# Patient Record
Sex: MALE | Race: WHITE | HISPANIC OR LATINO | Employment: UNEMPLOYED | URBAN - METROPOLITAN AREA
[De-identification: names, ages, dates, MRNs, and addresses within clinical notes are randomized per-mention and may not be internally consistent; named-entity substitution may affect disease eponyms.]

---

## 2021-10-09 ENCOUNTER — HOSPITAL ENCOUNTER (EMERGENCY)
Facility: HOSPITAL | Age: 51
Discharge: HOME | End: 2021-10-09
Attending: EMERGENCY MEDICINE | Admitting: EMERGENCY MEDICINE
Payer: MEDICAID

## 2021-10-09 ENCOUNTER — APPOINTMENT (EMERGENCY)
Dept: ULTRASOUND IMAGING | Facility: HOSPITAL | Age: 51
End: 2021-10-09
Payer: MEDICAID

## 2021-10-09 VITALS
HEART RATE: 86 BPM | DIASTOLIC BLOOD PRESSURE: 71 MMHG | SYSTOLIC BLOOD PRESSURE: 134 MMHG | OXYGEN SATURATION: 98 % | TEMPERATURE: 98.3 F | RESPIRATION RATE: 18 BRPM

## 2021-10-09 DIAGNOSIS — M54.50 CHRONIC LOW BACK PAIN: ICD-10-CM

## 2021-10-09 DIAGNOSIS — J06.9 URI (UPPER RESPIRATORY INFECTION): Primary | ICD-10-CM

## 2021-10-09 DIAGNOSIS — G89.29 CHRONIC LOW BACK PAIN: ICD-10-CM

## 2021-10-09 DIAGNOSIS — K40.90 RIGHT INGUINAL HERNIA: ICD-10-CM

## 2021-10-09 LAB
BACTERIA UR QL AUTO: ABNORMAL /HPF
BILIRUB UR QL STRIP: NEGATIVE
CLARITY UR: CLEAR
COLOR UR: YELLOW
FLUAV RNA RESP QL NAA+PROBE: NEGATIVE
FLUBV RNA RESP QL NAA+PROBE: NEGATIVE
GLUCOSE UR STRIP-MCNC: NEGATIVE MG/DL
HGB UR QL STRIP.AUTO: ABNORMAL
KETONES UR STRIP-MCNC: NEGATIVE MG/DL
LEUKOCYTE ESTERASE UR QL STRIP: NEGATIVE
NITRITE UR QL STRIP: NEGATIVE
NON-SQ EPI CELLS URNS QL MICRO: ABNORMAL /HPF
PH UR STRIP.AUTO: 6.5 [PH]
PROT UR STRIP-MCNC: NEGATIVE MG/DL
RBC #/AREA URNS AUTO: ABNORMAL /HPF
RSV RNA RESP QL NAA+PROBE: NEGATIVE
SARS-COV-2 RNA RESP QL NAA+PROBE: NEGATIVE
SP GR UR STRIP.AUTO: <=1.005 (ref 1–1.03)
UROBILINOGEN UR QL STRIP.AUTO: 0.2 E.U./DL
WBC #/AREA URNS AUTO: ABNORMAL /HPF

## 2021-10-09 PROCEDURE — 0241U HB NFCT DS VIR RESP RNA 4 TRGT: CPT | Performed by: PHYSICIAN ASSISTANT

## 2021-10-09 PROCEDURE — 99284 EMERGENCY DEPT VISIT MOD MDM: CPT | Performed by: PHYSICIAN ASSISTANT

## 2021-10-09 PROCEDURE — 81001 URINALYSIS AUTO W/SCOPE: CPT | Performed by: PHYSICIAN ASSISTANT

## 2021-10-09 PROCEDURE — 76870 US EXAM SCROTUM: CPT

## 2021-10-09 PROCEDURE — 99283 EMERGENCY DEPT VISIT LOW MDM: CPT

## 2021-10-09 RX ORDER — NAPROXEN 500 MG/1
500 TABLET ORAL 2 TIMES DAILY WITH MEALS
Qty: 30 TABLET | Refills: 0 | Status: SHIPPED | OUTPATIENT
Start: 2021-10-09 | End: 2021-12-08

## 2021-10-09 RX ORDER — LIDOCAINE 50 MG/G
1 PATCH TOPICAL DAILY
Qty: 15 PATCH | Refills: 0 | Status: SHIPPED | OUTPATIENT
Start: 2021-10-09 | End: 2021-12-08

## 2021-10-27 ENCOUNTER — TELEPHONE (OUTPATIENT)
Dept: SURGERY | Facility: CLINIC | Age: 51
End: 2021-10-27

## 2021-12-02 ENCOUNTER — OFFICE VISIT (OUTPATIENT)
Dept: SURGERY | Facility: CLINIC | Age: 51
End: 2021-12-02

## 2021-12-02 VITALS
WEIGHT: 187 LBS | RESPIRATION RATE: 18 BRPM | HEART RATE: 71 BPM | TEMPERATURE: 97.9 F | HEIGHT: 71 IN | SYSTOLIC BLOOD PRESSURE: 112 MMHG | BODY MASS INDEX: 26.18 KG/M2 | DIASTOLIC BLOOD PRESSURE: 68 MMHG

## 2021-12-02 DIAGNOSIS — G89.29 CHRONIC BACK PAIN: ICD-10-CM

## 2021-12-02 DIAGNOSIS — M54.9 CHRONIC BACK PAIN: ICD-10-CM

## 2021-12-02 DIAGNOSIS — K40.90 RIGHT INGUINAL HERNIA: Primary | ICD-10-CM

## 2021-12-02 DIAGNOSIS — K42.9 UMBILICAL HERNIA: ICD-10-CM

## 2021-12-02 PROCEDURE — 99204 OFFICE O/P NEW MOD 45 MIN: CPT | Performed by: SURGERY

## 2021-12-02 RX ORDER — CEFAZOLIN SODIUM 2 G/50ML
2000 SOLUTION INTRAVENOUS ONCE
Status: CANCELLED | OUTPATIENT
Start: 2022-02-15 | End: 2021-12-02

## 2021-12-08 NOTE — PRE-PROCEDURE INSTRUCTIONS
Pre-Surgery Instructions:   Medication Instructions    Ascorbic Acid (VITAMIN C PO) Instructed patient per Anesthesia Guidelines  Med list reviewed as above  Also instructed pt not to start any new vitamins/supplements preoperatively and to avoid NSAID's  3 days prior to surgery  Tylenol is acceptable if needed  Pt has and/or is getting CHG surgical soap and verbalizes understanding of preoperative showering protocol  Reviewed [facility] current covid policy and pt understands that it may change at any time  All information within "My Surgical Experience" pamphlet reviewed and patient verbalizes understanding and compliance  All questions answered  All information exchanged with assistance of [employer]  #[id number]

## 2022-01-03 ENCOUNTER — HOSPITAL ENCOUNTER (EMERGENCY)
Facility: HOSPITAL | Age: 52
Discharge: HOME | End: 2022-01-03
Attending: EMERGENCY MEDICINE
Payer: COMMERCIAL

## 2022-01-03 VITALS
TEMPERATURE: 98.6 F | RESPIRATION RATE: 18 BRPM | DIASTOLIC BLOOD PRESSURE: 72 MMHG | SYSTOLIC BLOOD PRESSURE: 127 MMHG | HEART RATE: 81 BPM | OXYGEN SATURATION: 99 %

## 2022-01-03 DIAGNOSIS — J06.9 URI WITH COUGH AND CONGESTION: Primary | ICD-10-CM

## 2022-01-03 PROCEDURE — U0005 INFEC AGEN DETEC AMPLI PROBE: HCPCS | Performed by: EMERGENCY MEDICINE

## 2022-01-03 PROCEDURE — 99283 EMERGENCY DEPT VISIT LOW MDM: CPT

## 2022-01-03 PROCEDURE — U0003 INFECTIOUS AGENT DETECTION BY NUCLEIC ACID (DNA OR RNA); SEVERE ACUTE RESPIRATORY SYNDROME CORONAVIRUS 2 (SARS-COV-2) (CORONAVIRUS DISEASE [COVID-19]), AMPLIFIED PROBE TECHNIQUE, MAKING USE OF HIGH THROUGHPUT TECHNOLOGIES AS DESCRIBED BY CMS-2020-01-R: HCPCS | Performed by: EMERGENCY MEDICINE

## 2022-01-03 PROCEDURE — 99284 EMERGENCY DEPT VISIT MOD MDM: CPT | Performed by: EMERGENCY MEDICINE

## 2022-01-03 NOTE — Clinical Note
Amber Navarrete was seen and treated in our emergency department on 1/3/2022  Diagnosis:     Antonietta Granda  may return to work on return date  He may return on this date: 01/07/2022         If you have any questions or concerns, please don't hesitate to call        Thanh Choudhary DO    ______________________________           _______________          _______________  Hospital Representative                              Date                                Time

## 2022-01-03 NOTE — ED PROVIDER NOTES
History  Chief Complaint   Patient presents with    Cough     Cough x 4 days  Positive covid contacts  History provided by:  Patient  URI  Presenting symptoms: congestion and rhinorrhea    Presenting symptoms: no fever    Severity:  Moderate  Onset quality:  Gradual  Timing:  Constant  Progression:  Unchanged  Chronicity:  New  Relieved by:  None tried  Worsened by:  Nothing  Ineffective treatments:  None tried  Associated symptoms: myalgias    Associated symptoms: no headaches        Prior to Admission Medications   Prescriptions Last Dose Informant Patient Reported? Taking? Ascorbic Acid (VITAMIN C PO)  Self Yes No   Sig: Take 1,000 mg by mouth daily      Facility-Administered Medications: None       Past Medical History:   Diagnosis Date    [facility]ipolar disorder ([facility])     Chronic back pain     Chronic back pain     History of herniated intervertebral disc     Inguinal hernia     Paranoia ([facility])     Schizophrenia ([facility] [address] )     Seasonal allergies     Umbilical hernia        Past Surgical History:   Procedure Laterality Date    NO PAST SURGERIES         Family History   Adopted: Yes     I have reviewed and agree with the history as documented  E-Cigarette/Vaping    E-Cigarette Use Current Every Day User      E-Cigarette/Vaping Substances    Nicotine Yes      Social History     Tobacco Use    Smoking status: Current Some Day Smoker     Types: Cigarettes    Smokeless tobacco: Never Used    Tobacco comment: 1 cigarette per day and vapes 1 x per day   Vaping Use    Vaping Use: Every day    Substances: Nicotine   Substance Use Topics    Alcohol use: Not Currently    Drug use: Yes     Frequency: 7 0 times per week     Types: Marijuana     Comment: daily       Review of Systems   Constitutional: Negative for fever  HENT: Positive for congestion and rhinorrhea  Respiratory: Negative for chest tightness and shortness of breath  Cardiovascular: Negative for chest pain     Musculoskeletal: Positive for myalgias  Skin: Negative for rash  Neurological: Negative for dizziness, light-headedness and headaches  Physical Exam  Physical Exam  Vitals reviewed  Constitutional:       Appearance: He is well-developed  HENT:      Head: Atraumatic  Eyes:      General: No scleral icterus  Right eye: No discharge  Left eye: No discharge  Conjunctiva/sclera: Conjunctivae normal    Neck:      Trachea: No tracheal deviation  Pulmonary:      Effort: Pulmonary effort is normal  No respiratory distress  Breath sounds: No stridor  Musculoskeletal:         General: No deformity  Cervical back: Neck supple  Skin:     General: Skin is warm and dry  Coloration: Skin is not pale  Findings: No erythema or rash  Neurological:      Mental Status: He is alert  Motor: No abnormal muscle tone  Coordination: Coordination normal          Vital Signs  ED Triage Vitals [01/03/22 1141]   Temperature Pulse Respirations Blood Pressure SpO2   98 6 °F (37 °C) 81 18 127/72 99 %      Temp Source Heart Rate Source Patient Position - Orthostatic VS BP Location FiO2 (%)   Oral Monitor Sitting Left arm --      Pain Score       6           Vitals:    01/03/22 1141   BP: 127/72   Pulse: 81   Patient Position - Orthostatic VS: Sitting         Visual Acuity      ED Medications  Medications - No data to display    Diagnostic Studies  Results Reviewed     Procedure Component Value Units Date/Time    COVID only - 48 hour TAT [[id number]] Collected: 01/03/22 1145    Lab Status:  In process Specimen: Nares from Nose Updated: 01/03/22 1155                 No orders to display              Procedures  Procedures         ED Course                                             MDM  Number of Diagnoses or Management Options  URI with cough and congestion: new and requires workup     Amount and/or Complexity of Data Reviewed  Clinical lab tests: ordered        Disposition  Final diagnoses:   URI with cough and congestion     Time reflects when diagnosis was documented in both MDM as applicable and the Disposition within this note     Time User Action Codes Description Comment    1/3/2022 11:44 AM Patric Doll Add [J06 9] URI with cough and congestion       ED Disposition     ED Disposition Condition Date/Time Comment    Discharge Stable Mon Juan Pablo 3, 2022 11:44 AM Zainab Pierre discharge to home/self care  Follow-up Information    None         Discharge Medication List as of 1/3/2022 11:45 AM      CONTINUE these medications which have NOT CHANGED    Details   Ascorbic Acid (VITAMIN C PO) Take 1,000 mg by mouth daily, Historical Med             No discharge procedures on file      PDMP Review     None          ED Provider  Electronically Signed by           Jeremie Bullock DO  01/03/22 2057

## 2022-01-05 LAB — SARS-COV-2 RNA RESP QL NAA+PROBE: NEGATIVE

## 2022-01-06 ENCOUNTER — TELEPHONE (OUTPATIENT)
Dept: SURGERY | Facility: CLINIC | Age: 52
End: 2022-01-06

## 2022-01-06 NOTE — TELEPHONE ENCOUNTER
I spoke with Adiel's daughter, Isma Lo  She and her father were just in the ER with an URI  They both tested negative for Covid on 1/3, but mentioned that the end of November beginning of December they were all sick with Covid, she thinks  They were never tested but both lost their taste and smell  I advised them to contact their family doctor because they are both still coughing a lot and are not well  Adiel's surgery for 1/11 is now moved to 2/15 at the [address] [place] [address],[address], pending how he feels

## 2022-01-26 ENCOUNTER — TELEPHONE (OUTPATIENT)
Dept: PAIN MEDICINE | Facility: CLINIC | Age: 52
End: 2022-01-26

## 2022-01-26 NOTE — TELEPHONE ENCOUNTER
Patients daughter is requesting records that have been faxed to our office and are scanned in to patients chart to please be faxed over to pauline Duke   [address] [place] [address]: [phone]  Fax # [phone]    Thank you

## 2022-02-07 ENCOUNTER — OFFICE VISIT (OUTPATIENT)
Dept: LAB | Facility: CLINIC | Age: 52
End: 2022-02-07
Payer: COMMERCIAL

## 2022-02-07 ENCOUNTER — APPOINTMENT (OUTPATIENT)
Dept: LAB | Facility: CLINIC | Age: 52
End: 2022-02-07
Payer: COMMERCIAL

## 2022-02-07 DIAGNOSIS — K40.90 RIGHT INGUINAL HERNIA: ICD-10-CM

## 2022-02-07 DIAGNOSIS — K42.9 UMBILICAL HERNIA: ICD-10-CM

## 2022-02-07 LAB
ALBUMIN SERPL BCP-MCNC: 3.9 G/DL (ref 3.5–5)
ALP SERPL-CCNC: 93 U/L (ref 46–116)
ALT SERPL W P-5'-P-CCNC: 22 U/L (ref 12–78)
ANION GAP SERPL CALCULATED.3IONS-SCNC: 5 MMOL/L (ref 4–13)
AST SERPL W P-5'-P-CCNC: 19 U/L (ref 5–45)
ATRIAL RATE: 63 BPM
BILIRUB SERPL-MCNC: 0.27 MG/DL (ref 0.2–1)
BUN SERPL-MCNC: 11 MG/DL (ref 5–25)
CALCIUM SERPL-MCNC: 9 MG/DL (ref 8.3–10.1)
CHLORIDE SERPL-SCNC: 104 MMOL/L (ref 100–108)
CO2 SERPL-SCNC: 28 MMOL/L (ref 21–32)
CREAT SERPL-MCNC: 0.79 MG/DL (ref 0.6–1.3)
ERYTHROCYTE [DISTWIDTH] IN BLOOD BY AUTOMATED COUNT: 15 % (ref 11.6–15.1)
GFR SERPL CREATININE-BSD FRML MDRD: 103 ML/MIN/1.73SQ M
GLUCOSE SERPL-MCNC: 99 MG/DL (ref 65–140)
HCT VFR BLD AUTO: 50.4 % (ref 36.5–49.3)
HGB BLD-MCNC: 16.1 G/DL (ref 12–17)
MCH RBC QN AUTO: 29.2 PG (ref 26.8–34.3)
MCHC RBC AUTO-ENTMCNC: 31.9 G/DL (ref 31.4–37.4)
MCV RBC AUTO: 92 FL (ref 82–98)
P AXIS: 52 DEGREES
PLATELET # BLD AUTO: 351 THOUSANDS/UL (ref 149–390)
PMV BLD AUTO: 9.4 FL (ref 8.9–12.7)
POTASSIUM SERPL-SCNC: 4.6 MMOL/L (ref 3.5–5.3)
PR INTERVAL: 116 MS
PROT SERPL-MCNC: 8.3 G/DL (ref 6.4–8.2)
QRS AXIS: -64 DEGREES
QRSD INTERVAL: 108 MS
QT INTERVAL: 400 MS
QTC INTERVAL: 409 MS
RBC # BLD AUTO: 5.51 MILLION/UL (ref 3.88–5.62)
SODIUM SERPL-SCNC: 137 MMOL/L (ref 136–145)
T WAVE AXIS: 59 DEGREES
VENTRICULAR RATE: 63 BPM
WBC # BLD AUTO: 8.78 THOUSAND/UL (ref 4.31–10.16)

## 2022-02-07 PROCEDURE — 93005 ELECTROCARDIOGRAM TRACING: CPT

## 2022-02-07 PROCEDURE — 85027 COMPLETE CBC AUTOMATED: CPT

## 2022-02-07 PROCEDURE — 36415 COLL VENOUS BLD VENIPUNCTURE: CPT

## 2022-02-07 PROCEDURE — 93010 ELECTROCARDIOGRAM REPORT: CPT | Performed by: INTERNAL MEDICINE

## 2022-02-07 PROCEDURE — 80053 COMPREHEN METABOLIC PANEL: CPT

## 2022-02-07 NOTE — PRE-PROCEDURE INSTRUCTIONS
No outpatient medications have been marked as taking for the 2/15/22 encounter [facility] Encounter)  Pt denies fever, sob, sore throat and cough  [id number]  Pt instructed to stop nsaids and supplements one week prior to surgery  Pt verbalized understanding of shower and med instructions

## 2022-02-08 ENCOUNTER — ANESTHESIA EVENT (OUTPATIENT)
Dept: PERIOP | Facility: HOSPITAL | Age: 52
End: 2022-02-08
Payer: COMMERCIAL

## 2022-02-15 ENCOUNTER — HOSPITAL ENCOUNTER (OUTPATIENT)
Facility: HOSPITAL | Age: 52
Setting detail: OUTPATIENT SURGERY
Discharge: HOME | End: 2022-02-15
Attending: SURGERY | Admitting: SURGERY
Payer: COMMERCIAL

## 2022-02-15 ENCOUNTER — ANESTHESIA (OUTPATIENT)
Dept: PERIOP | Facility: HOSPITAL | Age: 52
End: 2022-02-15
Payer: COMMERCIAL

## 2022-02-15 VITALS
HEART RATE: 77 BPM | DIASTOLIC BLOOD PRESSURE: 98 MMHG | RESPIRATION RATE: 20 BRPM | WEIGHT: 148 LBS | HEIGHT: 69 IN | OXYGEN SATURATION: 100 % | TEMPERATURE: 97 F | BODY MASS INDEX: 21.92 KG/M2 | SYSTOLIC BLOOD PRESSURE: 155 MMHG

## 2022-02-15 DIAGNOSIS — K40.90 RIGHT INGUINAL HERNIA: Primary | ICD-10-CM

## 2022-02-15 DIAGNOSIS — K42.9 UMBILICAL HERNIA: ICD-10-CM

## 2022-02-15 PROBLEM — F17.200 SMOKING: Status: ACTIVE | Noted: 2022-02-15

## 2022-02-15 PROCEDURE — 49585 PR REPAIR UMBILICAL HERN,5+Y/O,REDUC: CPT | Performed by: PHYSICIAN ASSISTANT

## 2022-02-15 PROCEDURE — C1781 MESH (IMPLANTABLE): HCPCS | Performed by: SURGERY

## 2022-02-15 PROCEDURE — 49505 PRP I/HERN INIT REDUC >5 YR: CPT | Performed by: SURGERY

## 2022-02-15 PROCEDURE — 99024 POSTOP FOLLOW-UP VISIT: CPT | Performed by: SURGERY

## 2022-02-15 PROCEDURE — 88305 TISSUE EXAM BY PATHOLOGIST: CPT | Performed by: PATHOLOGY

## 2022-02-15 PROCEDURE — 49585 PR REPAIR UMBILICAL HERN,5+Y/O,REDUC: CPT | Performed by: SURGERY

## 2022-02-15 PROCEDURE — NC001 PR NO CHARGE: Performed by: PHYSICIAN ASSISTANT

## 2022-02-15 PROCEDURE — 49505 PRP I/HERN INIT REDUC >5 YR: CPT | Performed by: PHYSICIAN ASSISTANT

## 2022-02-15 DEVICE — POLYPROPYLENE NONABSORBABLE SYNTHETIC SURGICAL MESH
Type: IMPLANTABLE DEVICE | Site: INGUINAL | Status: FUNCTIONAL
Brand: PROLENE

## 2022-02-15 RX ORDER — DEXAMETHASONE SODIUM PHOSPHATE 10 MG/ML
INJECTION, SOLUTION INTRAMUSCULAR; INTRAVENOUS AS NEEDED
Status: DISCONTINUED | OUTPATIENT
Start: 2022-02-15 | End: 2022-02-15

## 2022-02-15 RX ORDER — MIDAZOLAM HYDROCHLORIDE 2 MG/2ML
INJECTION, SOLUTION INTRAMUSCULAR; INTRAVENOUS AS NEEDED
Status: DISCONTINUED | OUTPATIENT
Start: 2022-02-15 | End: 2022-02-15

## 2022-02-15 RX ORDER — ONDANSETRON 2 MG/ML
4 INJECTION INTRAMUSCULAR; INTRAVENOUS ONCE AS NEEDED
Status: DISCONTINUED | OUTPATIENT
Start: 2022-02-15 | End: 2022-02-15 | Stop reason: HOSPADM

## 2022-02-15 RX ORDER — OXYCODONE HYDROCHLORIDE AND ACETAMINOPHEN 5; 325 MG/1; MG/1
1 TABLET ORAL EVERY 6 HOURS PRN
Qty: 10 TABLET | Refills: 0 | Status: SHIPPED | OUTPATIENT
Start: 2022-02-15 | End: 2022-02-18

## 2022-02-15 RX ORDER — BUPIVACAINE HYDROCHLORIDE AND EPINEPHRINE 2.5; 5 MG/ML; UG/ML
INJECTION, SOLUTION EPIDURAL; INFILTRATION; INTRACAUDAL; PERINEURAL AS NEEDED
Status: DISCONTINUED | OUTPATIENT
Start: 2022-02-15 | End: 2022-02-15 | Stop reason: HOSPADM

## 2022-02-15 RX ORDER — CEFAZOLIN SODIUM 2 G/50ML
2000 SOLUTION INTRAVENOUS ONCE
Status: COMPLETED | OUTPATIENT
Start: 2022-02-15 | End: 2022-02-15

## 2022-02-15 RX ORDER — FENTANYL CITRATE 50 UG/ML
INJECTION, SOLUTION INTRAMUSCULAR; INTRAVENOUS AS NEEDED
Status: DISCONTINUED | OUTPATIENT
Start: 2022-02-15 | End: 2022-02-15

## 2022-02-15 RX ORDER — SODIUM CHLORIDE, SODIUM LACTATE, POTASSIUM CHLORIDE, CALCIUM CHLORIDE 600; 310; 30; 20 MG/100ML; MG/100ML; MG/100ML; MG/100ML
125 INJECTION, SOLUTION INTRAVENOUS CONTINUOUS
Status: DISCONTINUED | OUTPATIENT
Start: 2022-02-15 | End: 2022-02-15 | Stop reason: HOSPADM

## 2022-02-15 RX ORDER — BUPIVACAINE HYDROCHLORIDE 5 MG/ML
INJECTION, SOLUTION PERINEURAL AS NEEDED
Status: DISCONTINUED | OUTPATIENT
Start: 2022-02-15 | End: 2022-02-15 | Stop reason: HOSPADM

## 2022-02-15 RX ORDER — LIDOCAINE HYDROCHLORIDE 10 MG/ML
INJECTION, SOLUTION EPIDURAL; INFILTRATION; INTRACAUDAL; PERINEURAL AS NEEDED
Status: DISCONTINUED | OUTPATIENT
Start: 2022-02-15 | End: 2022-02-15

## 2022-02-15 RX ORDER — SODIUM CHLORIDE, SODIUM LACTATE, POTASSIUM CHLORIDE, CALCIUM CHLORIDE 600; 310; 30; 20 MG/100ML; MG/100ML; MG/100ML; MG/100ML
100 INJECTION, SOLUTION INTRAVENOUS CONTINUOUS
Status: DISCONTINUED | OUTPATIENT
Start: 2022-02-15 | End: 2022-02-15 | Stop reason: HOSPADM

## 2022-02-15 RX ORDER — FENTANYL CITRATE/PF 50 MCG/ML
50 SYRINGE (ML) INJECTION
Status: DISCONTINUED | OUTPATIENT
Start: 2022-02-15 | End: 2022-02-15 | Stop reason: HOSPADM

## 2022-02-15 RX ORDER — ONDANSETRON 2 MG/ML
INJECTION INTRAMUSCULAR; INTRAVENOUS AS NEEDED
Status: DISCONTINUED | OUTPATIENT
Start: 2022-02-15 | End: 2022-02-15

## 2022-02-15 RX ORDER — PROPOFOL 10 MG/ML
INJECTION, EMULSION INTRAVENOUS AS NEEDED
Status: DISCONTINUED | OUTPATIENT
Start: 2022-02-15 | End: 2022-02-15

## 2022-02-15 RX ORDER — OXYCODONE HYDROCHLORIDE AND ACETAMINOPHEN 5; 325 MG/1; MG/1
1 TABLET ORAL ONCE AS NEEDED
Status: COMPLETED | OUTPATIENT
Start: 2022-02-15 | End: 2022-02-15

## 2022-02-15 RX ADMIN — LIDOCAINE HYDROCHLORIDE 50 MG: 10 INJECTION, SOLUTION EPIDURAL; INFILTRATION; INTRACAUDAL; PERINEURAL at 09:33

## 2022-02-15 RX ADMIN — ONDANSETRON 4 MG: 2 INJECTION INTRAMUSCULAR; INTRAVENOUS at 10:31

## 2022-02-15 RX ADMIN — FENTANYL CITRATE 25 MCG: 50 INJECTION, SOLUTION INTRAMUSCULAR; INTRAVENOUS at 09:34

## 2022-02-15 RX ADMIN — OXYCODONE HYDROCHLORIDE AND ACETAMINOPHEN 1 TABLET: 5; 325 TABLET ORAL at 11:42

## 2022-02-15 RX ADMIN — SODIUM CHLORIDE, SODIUM LACTATE, POTASSIUM CHLORIDE, AND CALCIUM CHLORIDE: .6; .31; .03; .02 INJECTION, SOLUTION INTRAVENOUS at 09:12

## 2022-02-15 RX ADMIN — CEFAZOLIN SODIUM 2000 MG: 2 SOLUTION INTRAVENOUS at 09:15

## 2022-02-15 RX ADMIN — FENTANYL CITRATE 50 MCG: 50 INJECTION INTRAMUSCULAR; INTRAVENOUS at 10:48

## 2022-02-15 RX ADMIN — FENTANYL CITRATE 25 MCG: 50 INJECTION, SOLUTION INTRAMUSCULAR; INTRAVENOUS at 09:33

## 2022-02-15 RX ADMIN — DEXAMETHASONE SODIUM PHOSPHATE 4 MG: 10 INJECTION, SOLUTION INTRAMUSCULAR; INTRAVENOUS at 09:42

## 2022-02-15 RX ADMIN — FENTANYL CITRATE 50 MCG: 50 INJECTION INTRAMUSCULAR; INTRAVENOUS at 11:01

## 2022-02-15 RX ADMIN — PROPOFOL 200 MG: 10 INJECTION, EMULSION INTRAVENOUS at 09:33

## 2022-02-15 RX ADMIN — PROPOFOL 50 MG: 10 INJECTION, EMULSION INTRAVENOUS at 09:37

## 2022-02-15 RX ADMIN — MIDAZOLAM HYDROCHLORIDE 2 MG: 1 INJECTION, SOLUTION INTRAMUSCULAR; INTRAVENOUS at 09:29

## 2022-02-15 NOTE — OP NOTE
PERATIVE REPORT  PATIENT NAME: Charles oJyce    :  1970  MRN: [id number]  Pt Location: [facility] OR ROOM 02    SURGERY DATE: 2/15/2022    Surgeon(s) and Role:     * Becky Spann MD - Primary     * Daylin Malloy PA-C - Assisting    Preop Diagnosis:  Right inguinal hernia [P29 66]  Umbilical hernia [R43 9]    Post-Op Diagnosis Codes:     * Right inguinal hernia [J09 71]     * Umbilical hernia [X00 4]    Procedure(s) (LRB):  REPAIR HERNIA INGUINAL (Right)  REPAIR HERNIA UMBILICAL (N/A)    Specimen(s):  ID Type Source Tests Collected by Time Destination   1 : UMBILICAL HERNIA SAC Tissue Soft Tissue, Other TISSUE EXAM Becky Spann MD 2/15/2022  9:50 AM        Estimated Blood Loss:   Minimal    Drains:  * No LDAs found *    Anesthesia Type:   General    Operative Indications:  Right inguinal hernia [P84 18]  Umbilical hernia [W86 0]      Operative Findings:  Same    Complications:   None    Procedure and Technique:  The patient was identified by me and placed in supine position where upon an LMA was placed and general anesthesia was started  Abdomen and groin were now prepped and draped in a normal surgical manner and a time-out was done  I decided to repair the umbilical hernia 1st   Marcaine without epinephrine was infused on 3 sides and then directly over a incision which I had drawn out with a marking pen  Skin was incised with knife and sharp dissection was done to the level of the fascia  I now bluntly dissected around the umbilical stalk off and placed a hemostat here  I sharply dissected the umbilical skin from the stalk  The hernia was less than 5 mm  This was now grasped or I should say the fascia was grasped and closed with 1 suture of interrupted Prolene  Umbilical skin was tacked down with a Vicryl and then a few other subcutaneous sutures were placed later on when both of the incisions were closed at the same time  I now paid attention to the right inguinal hernia    Skin was again marked and a ilioinguinal block was given  Transverse minimally oblique skin incision centered at the internal ring is now made about 5 cm in length  This is carried down through subcutaneous tissues with the Bovie  External oblique is cleared and the ilioinguinal nerve is again blocked at this point in time  Ileana Marixa is made in the external oblique and opened with the Metzenbaum retractors were now placed  I bluntly dissected underneath the cord and  this from the floor  Floor appeared to be intact  There was a fairly large sac which was dissected free and inverted underneath the internal ring  I now placed a finger through the internal ring and made sure that there was a big enough space to place an [employer] hernia systems mesh  The mesh was now soaked in antibiotic and placed  The underlie portion was completely opened and a nick was made in the overlie portion  The nick was on the lateral side  Fiber 6 sutures of Prolene are now used to attach the mesh to the pubic tubercle inferiorly, shelving edge medially making sure to close the defect I made in the mesh and laterally 2 sutures to the conjoined tendon  The superior most portion of the mass was simply opened up and not sutured in place secondary to closing the external oblique on top of it  Antibiotic irrigation was now done and the external oblique was closed with running Vicryl and then a couple subcutaneous sutures followed by subcuticular followed by Exofin  While that was being done the original incision for the umbilicus was closed in a totally the same way  There was no qualified resident to assist   Accordingly, Page SAVAGE was the 1st assistant    She was essential for help in the dissection and the closure   I was present for the entire procedure    Patient Disposition:  PACU       SIGNATURE: Rodger Bermudez MD  DATE: February 15, 2022  TIME: 10:35 AM

## 2022-02-15 NOTE — ANESTHESIA POSTPROCEDURE EVALUATION
Post-Op Assessment Note    CV Status:  Stable    Pain management: adequate     Mental Status:  Alert and awake   Hydration Status:  Euvolemic and stable   PONV Controlled:  Controlled   Airway Patency:  Patent      Post Op Vitals Reviewed: Yes      Staff: Anesthesiologist         No complications documented      BP      Temp     Pulse     Resp      SpO2

## 2022-02-15 NOTE — ANESTHESIA PREPROCEDURE EVALUATION
Procedure:  REPAIR HERNIA INGUINAL (Right Groin)  REPAIR HERNIA UMBILICAL (N/A Abdomen)    Relevant Problems   ANESTHESIA (within normal limits)      CARDIO (within normal limits)      MUSCULOSKELETAL   (+) Chronic back pain      NEURO/PSYCH   (+) Chronic back pain        Physical Exam    Airway    Mallampati score: I  TM Distance: >3 FB  Neck ROM: full     Dental       Cardiovascular  Rhythm: regular, Rate: normal,     Pulmonary  Breath sounds clear to auscultation,     Other Findings        Anesthesia Plan  ASA Score- 2     Anesthesia Type- general with ASA Monitors  Additional Monitors:   Airway Plan: LMA  Plan Factors-Exercise tolerance (METS): >4 METS  Chart reviewed  Existing labs reviewed  Patient summary reviewed  Patient is a current smoker  Patient smoked on day of surgery  Obstructive sleep apnea risk education given perioperatively  Induction- intravenous  Postoperative Plan- Plan for postoperative opioid use  Informed Consent- Anesthetic plan and risks discussed with patient  I personally reviewed this patient with the CRNA  Discussed and agreed on the Anesthesia Plan with the CRNA  Petey Hubbard

## 2022-02-15 NOTE — H&P
Valarie Allen MD (Physician) [facility] General Surgery  Assessment/Plan: the patient has a scrotal hernia on the right side as well as a small but painful umbilical hernia  Both will be repaired at the same time     No problem-specific Assessment & Plan notes found for this encounter          Diagnoses and all orders for this visit:     Right inguinal hernia  -     Comprehensive metabolic panel; Future  -     CBC and Platelet; Future  -     EKG 12 lead; Future  -     Case request operating room: REPAIR HERNIA INGUINAL, REPAIR HERNIA UMBILICAL; Standing  -     Case request operating room: REPAIR HERNIA INGUINAL, REPAIR HERNIA UMBILICAL     Umbilical hernia  -     Comprehensive metabolic panel; Future  -     CBC and Platelet; Future  -     EKG 12 lead; Future  -     Case request operating room: REPAIR HERNIA INGUINAL, REPAIR HERNIA UMBILICAL; Standing  -     Case request operating room: REPAIR HERNIA INGUINAL, REPAIR HERNIA UMBILICAL     Chronic back pain            Subjective:       Patient ID: Erlinda Neumann is a 46 y o  male       The patient is a 59-year-old  male with about a 4 year history of a right inguinal hernia  This has gotten larger and is to the point where it goes down into his scrotum  Can however push this back in  He also has pain for years at the umbilicus  He is Vincentian-speaking but I think he understands English fairly well  Still, we used a translation line for this        The following portions of the patient's history were reviewed and updated as appropriate: allergies, current medications, past family history, past medical history, past social history, past surgical history and problem list      Review of Systems   Constitutional:        No covid vaccines  Gained some weight   HENT: Positive for sinus pressure  Eyes:        Needs glasses   Respiratory:        13 to 33 years 1/2-1 ppd   Cardiovascular:        Negative   Gastrointestinal:        GERD   Endocrine: Negative  Genitourinary:        Recently frequency   Musculoskeletal: Positive for arthralgias and back pain  Skin: Positive for rash  ? Psoriasis, gets rashes down legs   Neurological: Positive for headaches  Hematological: Negative  Psychiatric/Behavioral:        ? Hx of depression  Sees pain management          Objective:        /68 (BP Location: Left arm, Patient Position: Sitting, Cuff Size: Adult)   Pulse 71   Temp 97 9 °F (36 6 °C)   Resp 18   Ht 5' 11" (1 803 m)   Wt 84 8 kg (187 lb)   BMI 26 08 kg/m²             Physical Exam  Vitals reviewed  Constitutional:       Appearance: Normal appearance  He is normal weight  HENT:      Head: Normocephalic and atraumatic  Eyes:      General: No scleral icterus  Conjunctiva/sclera: Conjunctivae normal    Cardiovascular:      Rate and Rhythm: Normal rate and regular rhythm  Heart sounds: Normal heart sounds  No murmur heard        Pulmonary:      Effort: Pulmonary effort is normal  No respiratory distress  Breath sounds: Normal breath sounds  No stridor  No wheezing, rhonchi or rales  Abdominal:      Palpations: Abdomen is soft  Tenderness: There is abdominal tenderness  Hernia: A hernia is present  Comments: He has a visible as well as reducible right inguinal hernia  This actually went down was testicle but I was able to push it back up  He has got a small umbilical hernia to the right of the umbilicus  This is exquisitely tender  It is also visit   Genitourinary:     Penis: Normal        Testes: Normal    Musculoskeletal:         General: Normal range of motion  Cervical back: Normal range of motion  Lymphadenopathy:      Cervical: No cervical adenopathy  Skin:     General: Skin is warm and dry  Coloration: Skin is not jaundiced  Neurological:      Mental Status: He is alert and oriented to person, place, and time     Psychiatric:         Mood and Affect: Mood normal          Behavior: Behavior normal          Thought Content: Thought content normal          Judgment: Judgment normal                 Since the above was written the patient's surgery was scheduled twice  This ended up being delayed because of possible COVID  As it turns out he had a significant URI but he was COVID negative on every test   He presents today for hernia repair  On examination he is exquisitely tender at the umbilicus as before  He has a large right inguinal hernia but this is not down in the scrotal sac today  Lungs are clear with good bilateral expansion and no rales rhonchi or wheezing    Heart sounds are normal with no heart murmur and he appears to be in regular sinus rhythm

## 2022-02-17 ENCOUNTER — OFFICE VISIT (OUTPATIENT)
Dept: SURGERY | Facility: CLINIC | Age: 52
End: 2022-02-17

## 2022-02-17 VITALS
DIASTOLIC BLOOD PRESSURE: 84 MMHG | HEIGHT: 69 IN | BODY MASS INDEX: 22.36 KG/M2 | RESPIRATION RATE: 22 BRPM | TEMPERATURE: 98.2 F | SYSTOLIC BLOOD PRESSURE: 146 MMHG | HEART RATE: 84 BPM | OXYGEN SATURATION: 98 % | WEIGHT: 151 LBS

## 2022-02-17 DIAGNOSIS — K40.90 RIGHT INGUINAL HERNIA: Primary | ICD-10-CM

## 2022-02-17 PROCEDURE — 99024 POSTOP FOLLOW-UP VISIT: CPT | Performed by: SURGERY

## 2022-02-17 RX ORDER — OXYCODONE HYDROCHLORIDE AND ACETAMINOPHEN 5; 325 MG/1; MG/1
1 TABLET ORAL EVERY 4 HOURS PRN
Qty: 12 TABLET | Refills: 0 | Status: SHIPPED | OUTPATIENT
Start: 2022-02-17 | End: 2022-04-29 | Stop reason: HOSPADM

## 2022-02-17 NOTE — PROGRESS NOTES
First postop from repair of an umbilical hernia and a right inguinal hernia  The patient complains of in nor did amounts of pain and is also concerned about swelling  On examination, he looks great  Both hernia repairs are intact  Is actually less swelling from the inguinal hernia than normal given the fact that this was a scrotal hernia     I gave him another prescription for Percocet and told his family member to make sure he only gets 3 a day along with an Aleve at the same time

## 2022-03-23 ENCOUNTER — HOSPITAL ENCOUNTER (EMERGENCY)
Facility: HOSPITAL | Age: 52
Discharge: HOME | End: 2022-03-23
Attending: EMERGENCY MEDICINE | Admitting: EMERGENCY MEDICINE
Payer: COMMERCIAL

## 2022-03-23 ENCOUNTER — APPOINTMENT (EMERGENCY)
Dept: RADIOLOGY | Facility: HOSPITAL | Age: 52
End: 2022-03-23
Payer: COMMERCIAL

## 2022-03-23 VITALS
HEART RATE: 80 BPM | OXYGEN SATURATION: 97 % | RESPIRATION RATE: 16 BRPM | SYSTOLIC BLOOD PRESSURE: 121 MMHG | DIASTOLIC BLOOD PRESSURE: 86 MMHG | TEMPERATURE: 97.8 F

## 2022-03-23 DIAGNOSIS — M54.50 LOW BACK PAIN: ICD-10-CM

## 2022-03-23 DIAGNOSIS — M25.522 LEFT ELBOW PAIN: ICD-10-CM

## 2022-03-23 DIAGNOSIS — M70.32 BURSITIS OF LEFT ELBOW: Primary | ICD-10-CM

## 2022-03-23 PROCEDURE — 72100 X-RAY EXAM L-S SPINE 2/3 VWS: CPT

## 2022-03-23 PROCEDURE — 99283 EMERGENCY DEPT VISIT LOW MDM: CPT

## 2022-03-23 PROCEDURE — 96372 THER/PROPH/DIAG INJ SC/IM: CPT

## 2022-03-23 PROCEDURE — 99284 EMERGENCY DEPT VISIT MOD MDM: CPT | Performed by: EMERGENCY MEDICINE

## 2022-03-23 PROCEDURE — 73070 X-RAY EXAM OF ELBOW: CPT

## 2022-03-23 RX ORDER — KETOROLAC TROMETHAMINE 30 MG/ML
30 INJECTION, SOLUTION INTRAMUSCULAR; INTRAVENOUS ONCE
Status: COMPLETED | OUTPATIENT
Start: 2022-03-23 | End: 2022-03-23

## 2022-03-23 RX ORDER — NAPROXEN 500 MG/1
500 TABLET ORAL 2 TIMES DAILY WITH MEALS
Qty: 20 TABLET | Refills: 0 | Status: SHIPPED | OUTPATIENT
Start: 2022-03-23 | End: 2022-04-25

## 2022-03-23 RX ORDER — OXYCODONE HYDROCHLORIDE 5 MG/1
5 TABLET ORAL EVERY 4 HOURS PRN
Qty: 15 TABLET | Refills: 0 | Status: SHIPPED | OUTPATIENT
Start: 2022-03-23 | End: 2022-03-26

## 2022-03-23 RX ADMIN — KETOROLAC TROMETHAMINE 30 MG: 30 INJECTION, SOLUTION INTRAMUSCULAR at 06:46

## 2022-03-23 NOTE — ED PROVIDER NOTES
History  Chief Complaint   Patient presents with    Elbow Swelling     C/o left elbow swelling starting three days ago  Patient is a 46year old male with increased pain and swelling of left elbow for past few days  Marvene Amara yesterday and injured his lower back on right side  Denies elbow trauma  (+) uses elbow often  No fever  No numbness  No head injury or LOC  (+) knee pain  Was last seen in this ED on 1/3/22 for URI with cough  [facility] [place] [place] [facility] website checked on this patient and last Rx filled was on 2/17/22 for percocet for 10 day supply  History provided by:  Patient and relative (daughter)  Elbow Swelling  Associated symptoms: back pain    Associated symptoms: no fever        Prior to Admission Medications   Prescriptions Last Dose Informant Patient Reported? Taking?   oxyCODONE-acetaminophen (Percocet) 5-325 mg per tablet   No No   Sig: Take 1 tablet by mouth every 4 (four) hours as needed for moderate pain for up to 12 doses Max Daily Amount: 6 tablets      Facility-Administered Medications: None       Past Medical History:   Diagnosis Date    [facility] )     Chronic back pain     Chronic back pain     History of herniated intervertebral disc     Inguinal hernia     [facility]anoia ([facility] )     Schizophrenia ([facility] )     Seasonal allergies     Umbilical hernia        Past Surgical History:   Procedure Laterality Date    NO PAST SURGERIES      ND REPAIR ING HERNIA,5+Y/O,REDUCIBL Right 2/15/2022    Procedure: REPAIR HERNIA INGUINAL;  Surgeon: Manuel Bernal MD;  Location:  MAIN OR;  Service: General    ND REPAIR UMBILICAL PLXN,7+Q/Z,RZDJP N/A 2/15/2022    Procedure: REPAIR HERNIA UMBILICAL;  Surgeon: Manuel Bernal MD;  Location:  MAIN OR;  Service: General       Family History   Adopted: Yes     I have reviewed and agree with the history as documented      E-Cigarette/Vaping    E-Cigarette Use Former User      E-Cigarette/Vaping Substances    Nicotine No     THC No     CBD No     Flavoring No     Other No     Unknown No      Social History     Tobacco Use    Smoking status: Current Some Day Smoker     Types: Cigarettes    Smokeless tobacco: Never Used    Tobacco comment: 1 cigarette per day and vapes 1 x per day   Vaping Use    Vaping Use: Former   Substance Use Topics    Alcohol use: Not Currently    Drug use: Yes     Frequency: 7 0 times per week     Types: Marijuana     Comment: daily       Review of Systems   Constitutional: Negative for fever  Musculoskeletal: Positive for arthralgias, back pain and joint swelling  Neurological: Negative for numbness  All other systems reviewed and are negative  Physical Exam  Physical Exam  Vitals and nursing note reviewed  Constitutional:       General: He is in acute distress (moderate)  HENT:      Head: Normocephalic and atraumatic  Mouth/Throat:      Mouth: Mucous membranes are moist    Eyes:      General: No scleral icterus  Cardiovascular:      Rate and Rhythm: Normal rate and regular rhythm  Heart sounds: Normal heart sounds  No murmur heard  Pulmonary:      Effort: Pulmonary effort is normal  No respiratory distress  Breath sounds: Normal breath sounds  Abdominal:      General: Bowel sounds are normal       Palpations: Abdomen is soft  Tenderness: There is no abdominal tenderness  Musculoskeletal:         General: Swelling (left elbow) and tenderness (left elbow and paravertebral right lower lumbar region) present  Normal range of motion  Cervical back: Normal range of motion and neck supple  Right lower leg: No edema  Left lower leg: No edema  Skin:     General: Skin is warm and dry  Findings: No bruising, erythema or rash  Neurological:      General: No focal deficit present  Mental Status: He is alert and oriented to person, place, and time     Psychiatric:         Mood and Affect: Mood normal          Vital Signs  ED Triage Vitals [03/23/22 0626]   Temperature Pulse Respirations Blood Pressure SpO2   97 8 °F (36 6 °C) 75 16 121/86 97 %      Temp Source Heart Rate Source Patient Position - Orthostatic VS BP Location FiO2 (%)   Oral Monitor Sitting Right arm --      Pain Score       7           Vitals:    03/23/22 0626 03/23/22 0630   BP: 121/86 121/86   Pulse: 75 80   Patient Position - Orthostatic VS: Sitting          Visual Acuity      ED Medications  Medications   ketorolac (TORADOL) injection 30 mg (30 mg Intramuscular Given 3/23/22 0646)       Diagnostic Studies  Results Reviewed     None                 XR elbow 2 vw LEFT   ED Interpretation by Sophy Altman MD (03/23 0708)   No fx or dislocation or fb read by me  XR lumbar spine 2 or 3 views   ED Interpretation by Sophy Altman MD (03/23 9446)   DJD, disc space narrowing T12-L1 and no fx read by me  Procedures  Splint application    Date/Time: 3/23/2022 7:10 AM  Performed by: Sophy Altman MD  Authorized by: Sophy Altman MD   Universal Protocol:  Procedure performed by:  Consent given by: patient  Time out: Immediately prior to procedure a "time out" was called to verify the correct patient, procedure, equipment, support staff and site/side marked as required  Timeout called at: 3/23/2022 7:10 AM   Patient identity confirmed: verbally with patient      Pre-procedure details:     Sensation:  Normal    Skin color:  Normal  Procedure details:     Laterality:  Left    Location:  Elbow    Elbow:  L elbow    Strapping: yes      Supplies:  Elastic bandage  Post-procedure details:     Pain:  Unchanged    Sensation:  Unchanged    Skin color:  Normal    Patient tolerance of procedure: Tolerated well, no immediate complications             ED Course  ED Course as of 03/23/22 0718   Wed Mar 23, 2022   4595 X-rays d/w patient                                                MDM  Number of Diagnoses or Management Options  Diagnosis management comments: DDx including but not limited to: Doubt intracranial injury, concussion, cervical injury, intrathoracic injury, intraabdominal injury; extremity injury--fracture, dislocation, strain, sprain, contusion, vertebral fx, arthritis, bursitis, tendinitis; doubt septic bursitis or septic arthritis or abscess or cellulitis  Amount and/or Complexity of Data Reviewed  Tests in the radiology section of CPT®: ordered and reviewed  Decide to obtain previous medical records or to obtain history from someone other than the patient: yes  Obtain history from someone other than the patient: yes  Review and summarize past medical records: yes  Independent visualization of images, tracings, or specimens: yes        Disposition  Final diagnoses:   Bursitis of left elbow   Left elbow pain   Low back pain     Time reflects when diagnosis was documented in both MDM as applicable and the Disposition within this note     Time User Action Codes Description Comment    3/23/2022  6:54 AM Fifimel Brian Add [M70 32] Bursitis of left elbow     3/23/2022  6:54 AM Charmel Brian Add [Z48 292] Left elbow pain     3/23/2022  6:54 AM Charmel Brian Add [M54 50] Low back pain       ED Disposition     ED Disposition Condition Date/Time Comment    Discharge Stable Wed Mar 23, 2022  7:14 AM Teo Kessler discharge to home/self care  Follow-up Information     Follow up With Specialties Details Why Contact Info Additional [address] [facility] Call in 1 day Elevate, warm compresses  No driving with oxycodone  Return sooner if increased pain, worsening swelling, fever, redness, red streaks, numbness, weakness, incontinence, difficulty urinating, rash   [address] [phone] [address] [facility] [address] [place], [place], [id number] [phone]          Patient's Medications   Discharge Prescriptions    NAPROXEN (NAPROSYN) 500 MG TABLET    Take 1 tablet (500 mg total) by mouth 2 (two) times a day with meals for 10 days       Start Date: 3/23/2022 End Date: 4/2/2022       Order Dose: 500 mg       Quantity: 20 tablet    Refills: 0    OXYCODONE (ROXICODONE) 5 IMMEDIATE RELEASE TABLET    Take 1 tablet (5 mg total) by mouth every 4 (four) hours as needed for moderate pain for up to 3 days Max Daily Amount: 30 mg       Start Date: 3/23/2022 End Date: 3/26/2022       Order Dose: 5 mg       Quantity: 15 tablet    Refills: 0       No discharge procedures on file      PDMP Review       Value Time User    PDMP Reviewed  Yes 3/23/2022  6:20 AM Devaughn Angel MD          ED Provider  Electronically Signed by           Devaughn Angel MD  03/23/22 5967

## 2022-03-25 ENCOUNTER — OFFICE VISIT (OUTPATIENT)
Dept: OBGYN CLINIC | Facility: HOSPITAL | Age: 52
End: 2022-03-25
Payer: COMMERCIAL

## 2022-03-25 VITALS
HEART RATE: 86 BPM | HEIGHT: 69 IN | SYSTOLIC BLOOD PRESSURE: 152 MMHG | DIASTOLIC BLOOD PRESSURE: 76 MMHG | WEIGHT: 151 LBS | BODY MASS INDEX: 22.36 KG/M2

## 2022-03-25 DIAGNOSIS — M70.22 OLECRANON BURSITIS OF LEFT ELBOW: Primary | ICD-10-CM

## 2022-03-25 PROCEDURE — 99203 OFFICE O/P NEW LOW 30 MIN: CPT | Performed by: PHYSICIAN ASSISTANT

## 2022-03-25 NOTE — PROGRESS NOTES
Assessment:    Hemorrhagic olecranon bursitis, left, non-painful      Plan:    Elbow rest as able and ACE compression wrap to the area  Heating pad several times / day  Continue Naproxen  Follow-up 2-3 weeks for re-evaluation with primary care sports medicine          Problem List Items Addressed This Visit        Musculoskeletal and Integument    Olecranon bursitis of left elbow - Primary                   Subjective:     Patient ID:  Amber Navarrete is a 46 y o  male    HPI    59-year-old male presenting for evaluation of his left elbow  According to the patient and family member present, he bumped his posterior elbow about a week ago and following this he noticed a small lump localized to his olecranon region  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, and 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, and he thinks that the swelling increased after this  He has not noticed any open wounds or erythema/warmth to the area  He denies any fever or chills  He states that it is nonpainful or bothersome to him  He presented to an ER where x-rays were negative for acute findings and he was referred here for further evaluation  The following portions of the patient's history were reviewed and updated as appropriate: allergies, current medications, past family history, past medical history, past social history, past surgical history and problem list     Review of Systems     Objective:    Imaging:  Left elbow 3/23/2022  VIEWS:  XR ELBOW 2 VW LEFT   Images: 2     FINDINGS:  Prominent posterior soft tissue swelling consistent with olecranon bursitis  Olecranon enthesophyte      There is no acute fracture or dislocation      There is no joint effusion      No significant degenerative changes      No lytic or blastic osseous lesion      Soft tissues are unremarkable      IMPRESSION:  Olecranon bursitis  Enthesophyte  No acute osseous abnormality        Vitals:    03/25/22 1111   BP: 152/76   Pulse: 86           Physical Exam Orthopedic Examination:  Left elbow    Inspection:  There is a golf ball-sized olecranon bursa that is of normal color  There is no erythema  There is no overlying cellulitis  No open wounds  Palpation:  No warmth  Olecranon bursa is nontender to palpation, it is fluctuant  Range-of-motion:  Full, normal ROM elbow flexion extension, pronation supination without pain or restriction      Strength:  5/5 elbow flexion extension    Sensation:  Intact median radial ulnar nerve distribution    Special Tests:  Elbow joint is stable to varus and valgus stress   Palpable radial pulse

## 2022-04-13 ENCOUNTER — TELEPHONE (OUTPATIENT)
Dept: OBGYN CLINIC | Facility: HOSPITAL | Age: 52
End: 2022-04-13

## 2022-04-13 ENCOUNTER — APPOINTMENT (OUTPATIENT)
Dept: LAB | Facility: HOSPITAL | Age: 52
End: 2022-04-13
Attending: ORTHOPAEDIC SURGERY
Payer: COMMERCIAL

## 2022-04-13 ENCOUNTER — OFFICE VISIT (OUTPATIENT)
Dept: OBGYN CLINIC | Facility: CLINIC | Age: 52
End: 2022-04-13
Payer: COMMERCIAL

## 2022-04-13 ENCOUNTER — PREP FOR PROCEDURE (OUTPATIENT)
Dept: OBGYN CLINIC | Facility: CLINIC | Age: 52
End: 2022-04-13

## 2022-04-13 VITALS — HEIGHT: 69 IN | BODY MASS INDEX: 22.3 KG/M2

## 2022-04-13 DIAGNOSIS — Z01.818 PRE-OP TESTING: ICD-10-CM

## 2022-04-13 DIAGNOSIS — M70.22 OLECRANON BURSITIS OF LEFT ELBOW: ICD-10-CM

## 2022-04-13 DIAGNOSIS — M70.22 OLECRANON BURSITIS OF LEFT ELBOW: Primary | ICD-10-CM

## 2022-04-13 LAB
ANION GAP SERPL CALCULATED.3IONS-SCNC: 5 MMOL/L (ref 4–13)
BUN SERPL-MCNC: 15 MG/DL (ref 5–25)
CALCIUM SERPL-MCNC: 9.5 MG/DL (ref 8.4–10.2)
CHLORIDE SERPL-SCNC: 104 MMOL/L (ref 96–108)
CO2 SERPL-SCNC: 27 MMOL/L (ref 21–32)
CREAT SERPL-MCNC: 0.73 MG/DL (ref 0.6–1.3)
GFR SERPL CREATININE-BSD FRML MDRD: 107 ML/MIN/1.73SQ M
GLUCOSE P FAST SERPL-MCNC: 92 MG/DL (ref 65–99)
POTASSIUM SERPL-SCNC: 4.5 MMOL/L (ref 3.5–5.3)
SODIUM SERPL-SCNC: 136 MMOL/L (ref 135–147)

## 2022-04-13 PROCEDURE — 99214 OFFICE O/P EST MOD 30 MIN: CPT | Performed by: ORTHOPAEDIC SURGERY

## 2022-04-13 PROCEDURE — 80048 BASIC METABOLIC PNL TOTAL CA: CPT

## 2022-04-13 PROCEDURE — 36415 COLL VENOUS BLD VENIPUNCTURE: CPT

## 2022-04-13 RX ORDER — CHLORHEXIDINE GLUCONATE 0.12 MG/ML
15 RINSE ORAL ONCE
Status: CANCELLED | OUTPATIENT
Start: 2022-04-13 | End: 2022-04-13

## 2022-04-13 RX ORDER — CHLORHEXIDINE GLUCONATE 4 G/100ML
SOLUTION TOPICAL DAILY PRN
Status: CANCELLED | OUTPATIENT
Start: 2022-04-13

## 2022-04-13 RX ORDER — CEFAZOLIN SODIUM 1 G/50ML
1000 SOLUTION INTRAVENOUS ONCE
Status: CANCELLED | OUTPATIENT
Start: 2022-04-29 | End: 2022-04-13

## 2022-04-13 NOTE — PROGRESS NOTES
[address] [address] [place] [address]  Galo Hassan  [id number]  1970    ORTHOPAEDIC SURGERY OUTPATIENT NOTE  4/13/2022      HISTORY:  46 y o  male presents today for initial evaluation for his left olecranon bursitis  Patient was seen in the ED on 03/23/2022, due to increased pain and swelling after fall on 03/22/2022  He was then seen by TANIA Mauro on 03/25/2022 who recommended to use a compression wrap on the elbow, heating several times daily, use of naproxen as needed for pain/discomfort/swelling      Past Medical History:   Diagnosis Date    [facility] ([facility] )     Chronic back pain     Chronic back pain     History of herniated intervertebral disc     Inguinal hernia     Paranoia ([facility])     Schizophrenia ([facility] )     Seasonal allergies     Umbilical hernia        Past Surgical History:   Procedure Laterality Date    NO PAST SURGERIES      [place] REPAIR ING HERNIA,5+Y/O,REDUCIBL Right 2/15/2022    Procedure: REPAIR HERNIA INGUINAL;  Surgeon: Jd Jaramillo MD;  Location: [facility] MAIN OR;  Service: General    [place] REPAIR UMBILICAL PJAH,2+F/Q,EAVEM N/A 2/15/2022    Procedure: REPAIR HERNIA UMBILICAL;  Surgeon: Jd Jaramillo MD;  Location: [facility] MAIN OR;  Service: General       Social History     Socioeconomic History    Marital status: Single     Spouse name: Not on file    Number of children: Not on file    Years of education: Not on file    Highest education level: Not on file   Occupational History    Not on file   Tobacco Use    Smoking status: Current Some Day Smoker     Types: Cigarettes    Smokeless tobacco: Never Used    Tobacco comment: 1 cigarette per day and vapes 1 x per day   Vaping Use    Vaping Use: Former   Substance and Sexual Activity    Alcohol use: Not Currently    Drug use: Yes     Frequency: 7 0 times per week     Types: Marijuana     Comment: daily    Sexual activity: Not on file   Other Topics Concern    Not on file   Social History Narrative    Not on file     Social Determinants of Health     Financial Resource Strain: Not on file   Food Insecurity: Not on file   Transportation Needs: Not on file   Physical Activity: Not on file   Stress: Not on file   Social Connections: Not on file   Intimate Partner Violence: Not on file   Housing Stability: Not on file       Family History   Adopted: Yes        Patient's Medications   New Prescriptions    No medications on file   Previous Medications    NAPROXEN (NAPROSYN) 500 MG TABLET    Take 1 tablet (500 mg total) by mouth 2 (two) times a day with meals for 10 days    OXYCODONE-ACETAMINOPHEN (PERCOCET) 5-325 MG PER TABLET    Take 1 tablet by mouth every 4 (four) hours as needed for moderate pain for up to 12 doses Max Daily Amount: 6 tablets   Modified Medications    No medications on file   Discontinued Medications    No medications on file       No Known Allergies      5' 9" (1 753 m)   BMI 22 30 kg/m²      REVIEW OF SYSTEMS:  Constitutional: Negative  HEENT: Negative  Respiratory: Negative  Skin: Negative  Neurological: Negative  Psychiatric/Behavioral: Negative  Musculoskeletal: Negative except for that mentioned in the HPI      PHYSICAL EXAM:      R elbow:  Flexion: 140 degrees  Extension: 0 degrees  Pronation: 80 degrees  Supination: 80 degrees    TTP Lateral Epicondyle: negative  TTP Medial Epicondyle: negative  TTP Olecranon: negative  TTP Radial Head: negative  TTP Biceps Tendon: negative    Strength:  Flexion: 5/5  Extension: 5/5  Pronation: 5/5  Supination: 5/5    Pain with resisted wrist extension: negative  Pain with resisted 3rd finger extension: negative  Pain with resisted wrist flexion: negative    Varus laxity: negative  Valgus laxity: negative  Milking maneuver: negative  Moving valgus stress test: negative    Cubital tunnel Tinel's: negative    Radial/median/ulnar nerve intact    <2 sec cap refill      L elbow:  Flexion: 140 degrees  Extension: 0 degrees  Pronation: 80 degrees  Supination: 80 degrees    TTP Lateral Epicondyle: negative  TTP Medial Epicondyle: negative  TTP Olecranon: Positive at the triceps attachment  TTP Radial Head: negative  TTP Biceps Tendon: negative    Strength:  Flexion: 5/5  Extension: 5/5  Pronation: 5/5  Supination: 5/5    Pain with resisted wrist extension: negative  Pain with resisted 3rd finger extension: negative  Pain with resisted wrist flexion: negative    Varus laxity: negative  Valgus laxity: negative  Milking maneuver: negative  Moving valgus stress test: negative    Cubital tunnel Tinel's: negative    Radial/median/ulnar nerve intact    <2 sec cap refill    Neck:   Spurling's Maneuver: negative  FROM flexion, extension, rotation, sidebending    Reflexes:   Triceps: symmetric bilaterally  Biceps: symmetric bilaterally  Brachioradialis: symmetric bilaterally      Integumentary: - intact  Bruising: - No  Abrasion: - No   Rash - No   Laceration: - No       IMAGING:  Xrays of left elbow reviewed form 3/23/22:   Enthesophyte noted with olecranon bursal swelling    ASSESSMENT AND PLAN:  46 y o  male  Recurrent Olecranon bursitis of left elbow  Non operative treatments were discussed with the patient that includes an aspiration, did explain that there is a reoccurrence rate due to there being an Enthesophyte that continues to irritate the bursa sac  Surgical intervention would be an excision of olecranon bursa and osteophyte of the left elbow  The risks, benefits and recovery was discussed with the patient that includes continued soreness at the surgical site for up to 6-8 weeks post operatively  Patient shows understanding and agrees with this plan of care   The patient understands the risks and benefits of the procedure with risks including pain, stiffness, infection, neurovascular injury, recurrence of symptoms, failure of surgical procedure, inadvertent intraoperative complications, blood loss, blood clots, allergic reaction to anesthesia, stroke, heart attack, all up to and including to death  The patient understood and did consent for surgery today       Scribe Attestation    I,:  Kimber Tovar am acting as a scribe while in the presence of the attending physician :       I,:  Minerva Tam personally performed the services described in this documentation    as scribed in my presence :

## 2022-04-13 NOTE — TELEPHONE ENCOUNTER
Pt daughter calling, will be about 13 min late,  Attempted calling lisseth office with no answer,  Provided that may need to reschedule and unable to reach office      Daughter understood,

## 2022-04-21 ENCOUNTER — TELEPHONE (OUTPATIENT)
Dept: OBGYN CLINIC | Facility: CLINIC | Age: 52
End: 2022-04-21

## 2022-04-21 NOTE — TELEPHONE ENCOUNTER
Patient needs P/O appt changed to May 5th due Dr Savage hCester now being in the Operating Room all day Friday May 6th        He will be in the OR all day on Fridays beginning May 6th

## 2022-04-25 ENCOUNTER — ANESTHESIA EVENT (OUTPATIENT)
Dept: PERIOP | Facility: HOSPITAL | Age: 52
End: 2022-04-25
Payer: COMMERCIAL

## 2022-04-25 NOTE — PRE-PROCEDURE INSTRUCTIONS
No outpatient medications have been marked as taking for the 4/29/22 encounter [facility] Encounter)  Covid screening negative as per patient  Reviewed with patient via phone using [employer]  #[id number] all medication instructions  Advised not to take any NSAID's, Vitamins or Herbal products prior to the DOS  Acetaminophen products are ok to take  Reviewed showering instructions as given by surgical office  Instructed to call office with any questions or concerns  Instructed about NPO after midnight the night before DOS, except sips of water with allowed medications in AM on DOS  Smoking Cessation Education offered but Patient states he is not interested at this time  Instructed not to smoke any tobacco products or marijuana for 48 hours prior to surgery  Informed about call from José Miguel Barragan with the time to arrive for the scheduled surgery  Patient verbalized understanding

## 2022-04-28 NOTE — ANESTHESIA PREPROCEDURE EVALUATION
Procedure:  EXCISION BURSA OLECRANON with excision of osteophyte (Left Elbow)    Relevant Problems   MUSCULOSKELETAL   (+) Chronic back pain      NEURO/PSYCH   (+) Chronic back pain      PULMONARY   (+) Smoking        Physical Exam    Airway    Mallampati score: II  TM Distance: >3 FB  Neck ROM: full     Dental       Cardiovascular  Cardiovascular exam normal    Pulmonary  Pulmonary exam normal     Other Findings        Anesthesia Plan  ASA Score- 2     Anesthesia Type- general with ASA Monitors  Additional Monitors:   Airway Plan: LMA  Plan Factors-Exercise tolerance (METS): >4 METS  Chart reviewed  EKG reviewed  Imaging results reviewed  Existing labs reviewed  Patient summary reviewed  Patient is a current smoker  Induction- intravenous  Postoperative Plan- Plan for postoperative opioid use  Informed Consent- Anesthetic plan and risks discussed with patient  I personally reviewed this patient with the CRNA  Discussed and agreed on the Anesthesia Plan with the CRNA  Linda Olvera

## 2022-04-29 ENCOUNTER — HOSPITAL ENCOUNTER (OUTPATIENT)
Facility: HOSPITAL | Age: 52
Setting detail: OUTPATIENT SURGERY
Discharge: HOME | End: 2022-04-29
Attending: ORTHOPAEDIC SURGERY | Admitting: ORTHOPAEDIC SURGERY
Payer: COMMERCIAL

## 2022-04-29 ENCOUNTER — ANESTHESIA (OUTPATIENT)
Dept: PERIOP | Facility: HOSPITAL | Age: 52
End: 2022-04-29
Payer: COMMERCIAL

## 2022-04-29 VITALS
HEART RATE: 72 BPM | BODY MASS INDEX: 22.13 KG/M2 | SYSTOLIC BLOOD PRESSURE: 139 MMHG | HEIGHT: 68 IN | DIASTOLIC BLOOD PRESSURE: 59 MMHG | TEMPERATURE: 97.3 F | OXYGEN SATURATION: 100 % | RESPIRATION RATE: 16 BRPM | WEIGHT: 146 LBS

## 2022-04-29 DIAGNOSIS — Z01.818 PRE-OP TESTING: ICD-10-CM

## 2022-04-29 DIAGNOSIS — M70.22 OLECRANON BURSITIS OF LEFT ELBOW: ICD-10-CM

## 2022-04-29 LAB
INR PPP: 0.97 (ref 0.84–1.19)
PROTHROMBIN TIME: 12.7 SECONDS (ref 11.6–14.5)

## 2022-04-29 PROCEDURE — 85610 PROTHROMBIN TIME: CPT | Performed by: ORTHOPAEDIC SURGERY

## 2022-04-29 PROCEDURE — 24120 EXC/CRTG B1 CST/B9 TUM RDS: CPT | Performed by: ORTHOPAEDIC SURGERY

## 2022-04-29 RX ORDER — KETOROLAC TROMETHAMINE 30 MG/ML
INJECTION, SOLUTION INTRAMUSCULAR; INTRAVENOUS AS NEEDED
Status: DISCONTINUED | OUTPATIENT
Start: 2022-04-29 | End: 2022-04-29

## 2022-04-29 RX ORDER — ONDANSETRON 2 MG/ML
INJECTION INTRAMUSCULAR; INTRAVENOUS AS NEEDED
Status: DISCONTINUED | OUTPATIENT
Start: 2022-04-29 | End: 2022-04-29

## 2022-04-29 RX ORDER — NAPROXEN 250 MG/1
500 TABLET ORAL 2 TIMES DAILY WITH MEALS
COMMUNITY

## 2022-04-29 RX ORDER — MIDAZOLAM HYDROCHLORIDE 2 MG/2ML
INJECTION, SOLUTION INTRAMUSCULAR; INTRAVENOUS AS NEEDED
Status: DISCONTINUED | OUTPATIENT
Start: 2022-04-29 | End: 2022-04-29

## 2022-04-29 RX ORDER — CEPHALEXIN 500 MG/1
500 CAPSULE ORAL EVERY 6 HOURS SCHEDULED
Qty: 8 CAPSULE | Refills: 0 | Status: SHIPPED | OUTPATIENT
Start: 2022-04-29 | End: 2022-05-01

## 2022-04-29 RX ORDER — SODIUM CHLORIDE, SODIUM LACTATE, POTASSIUM CHLORIDE, CALCIUM CHLORIDE 600; 310; 30; 20 MG/100ML; MG/100ML; MG/100ML; MG/100ML
INJECTION, SOLUTION INTRAVENOUS CONTINUOUS PRN
Status: DISCONTINUED | OUTPATIENT
Start: 2022-04-29 | End: 2022-04-29

## 2022-04-29 RX ORDER — CEFAZOLIN SODIUM 2 G/50ML
SOLUTION INTRAVENOUS AS NEEDED
Status: DISCONTINUED | OUTPATIENT
Start: 2022-04-29 | End: 2022-04-29

## 2022-04-29 RX ORDER — GLYCOPYRROLATE 0.2 MG/ML
INJECTION INTRAMUSCULAR; INTRAVENOUS AS NEEDED
Status: DISCONTINUED | OUTPATIENT
Start: 2022-04-29 | End: 2022-04-29

## 2022-04-29 RX ORDER — PROPOFOL 10 MG/ML
INJECTION, EMULSION INTRAVENOUS AS NEEDED
Status: DISCONTINUED | OUTPATIENT
Start: 2022-04-29 | End: 2022-04-29

## 2022-04-29 RX ORDER — KETAMINE HYDROCHLORIDE 50 MG/ML
INJECTION, SOLUTION, CONCENTRATE INTRAMUSCULAR; INTRAVENOUS AS NEEDED
Status: DISCONTINUED | OUTPATIENT
Start: 2022-04-29 | End: 2022-04-29

## 2022-04-29 RX ORDER — LIDOCAINE HYDROCHLORIDE 10 MG/ML
INJECTION, SOLUTION EPIDURAL; INFILTRATION; INTRACAUDAL; PERINEURAL AS NEEDED
Status: DISCONTINUED | OUTPATIENT
Start: 2022-04-29 | End: 2022-04-29

## 2022-04-29 RX ORDER — FENTANYL CITRATE/PF 50 MCG/ML
25 SYRINGE (ML) INJECTION
Status: COMPLETED | OUTPATIENT
Start: 2022-04-29 | End: 2022-04-29

## 2022-04-29 RX ORDER — CHLORHEXIDINE GLUCONATE 0.12 MG/ML
15 RINSE ORAL ONCE
Status: COMPLETED | OUTPATIENT
Start: 2022-04-29 | End: 2022-04-29

## 2022-04-29 RX ORDER — CHLORHEXIDINE GLUCONATE 4 G/100ML
SOLUTION TOPICAL DAILY PRN
Status: DISCONTINUED | OUTPATIENT
Start: 2022-04-29 | End: 2022-04-29 | Stop reason: HOSPADM

## 2022-04-29 RX ORDER — EPHEDRINE SULFATE 50 MG/ML
INJECTION INTRAVENOUS AS NEEDED
Status: DISCONTINUED | OUTPATIENT
Start: 2022-04-29 | End: 2022-04-29

## 2022-04-29 RX ORDER — FENTANYL CITRATE 50 UG/ML
INJECTION, SOLUTION INTRAMUSCULAR; INTRAVENOUS AS NEEDED
Status: DISCONTINUED | OUTPATIENT
Start: 2022-04-29 | End: 2022-04-29

## 2022-04-29 RX ORDER — CEFAZOLIN SODIUM 1 G/50ML
1000 SOLUTION INTRAVENOUS ONCE
Status: DISCONTINUED | OUTPATIENT
Start: 2022-04-29 | End: 2022-04-29 | Stop reason: HOSPADM

## 2022-04-29 RX ORDER — DEXAMETHASONE SODIUM PHOSPHATE 10 MG/ML
INJECTION, SOLUTION INTRAMUSCULAR; INTRAVENOUS AS NEEDED
Status: DISCONTINUED | OUTPATIENT
Start: 2022-04-29 | End: 2022-04-29

## 2022-04-29 RX ORDER — OXYCODONE HYDROCHLORIDE 5 MG/1
5 TABLET ORAL EVERY 6 HOURS PRN
Qty: 8 TABLET | Refills: 0 | Status: SHIPPED | OUTPATIENT
Start: 2022-04-29 | End: 2022-05-01

## 2022-04-29 RX ORDER — HYDROMORPHONE HCL/PF 1 MG/ML
0.5 SYRINGE (ML) INJECTION
Status: DISCONTINUED | OUTPATIENT
Start: 2022-04-29 | End: 2022-04-29 | Stop reason: HOSPADM

## 2022-04-29 RX ADMIN — KETOROLAC TROMETHAMINE 15 MG: 30 INJECTION, SOLUTION INTRAMUSCULAR at 09:09

## 2022-04-29 RX ADMIN — FENTANYL CITRATE 25 MCG: 50 INJECTION, SOLUTION INTRAMUSCULAR; INTRAVENOUS at 09:50

## 2022-04-29 RX ADMIN — ONDANSETRON 4 MG: 2 INJECTION INTRAMUSCULAR; INTRAVENOUS at 09:03

## 2022-04-29 RX ADMIN — LIDOCAINE HYDROCHLORIDE 50 MG: 10 INJECTION, SOLUTION EPIDURAL; INFILTRATION; INTRACAUDAL; PERINEURAL at 08:32

## 2022-04-29 RX ADMIN — FENTANYL CITRATE 25 MCG: 50 INJECTION, SOLUTION INTRAMUSCULAR; INTRAVENOUS at 09:57

## 2022-04-29 RX ADMIN — FENTANYL CITRATE 50 MCG: 50 INJECTION, SOLUTION INTRAMUSCULAR; INTRAVENOUS at 08:32

## 2022-04-29 RX ADMIN — FENTANYL CITRATE 25 MCG: 50 INJECTION, SOLUTION INTRAMUSCULAR; INTRAVENOUS at 10:01

## 2022-04-29 RX ADMIN — KETAMINE HYDROCHLORIDE 20 MG: 50 INJECTION INTRAMUSCULAR; INTRAVENOUS at 09:02

## 2022-04-29 RX ADMIN — SODIUM CHLORIDE, SODIUM LACTATE, POTASSIUM CHLORIDE, AND CALCIUM CHLORIDE: .6; .31; .03; .02 INJECTION, SOLUTION INTRAVENOUS at 08:24

## 2022-04-29 RX ADMIN — FENTANYL CITRATE 50 MCG: 50 INJECTION, SOLUTION INTRAMUSCULAR; INTRAVENOUS at 08:38

## 2022-04-29 RX ADMIN — GLYCOPYRROLATE 0.2 MG: 0.2 INJECTION, SOLUTION INTRAMUSCULAR; INTRAVENOUS at 09:02

## 2022-04-29 RX ADMIN — MIDAZOLAM HYDROCHLORIDE 2 MG: 1 INJECTION, SOLUTION INTRAMUSCULAR; INTRAVENOUS at 08:29

## 2022-04-29 RX ADMIN — EPHEDRINE SULFATE 10 MG: 50 INJECTION, SOLUTION INTRAVENOUS at 08:45

## 2022-04-29 RX ADMIN — PROPOFOL 150 MG: 10 INJECTION, EMULSION INTRAVENOUS at 08:32

## 2022-04-29 RX ADMIN — FENTANYL CITRATE 25 MCG: 50 INJECTION, SOLUTION INTRAMUSCULAR; INTRAVENOUS at 09:53

## 2022-04-29 RX ADMIN — CHLORHEXIDINE GLUCONATE 0.12% ORAL RINSE 15 ML: 1.2 LIQUID ORAL at 07:41

## 2022-04-29 RX ADMIN — DEXAMETHASONE SODIUM PHOSPHATE 10 MG: 10 INJECTION, SOLUTION INTRAMUSCULAR; INTRAVENOUS at 08:32

## 2022-04-29 RX ADMIN — CEFAZOLIN SODIUM 2000 MG: 2 SOLUTION INTRAVENOUS at 08:30

## 2022-04-29 NOTE — ANESTHESIA POSTPROCEDURE EVALUATION
Post-Op Assessment Note    CV Status:  Stable  Pain Score: 1    Pain management: adequate     Mental Status:  Alert and awake   Hydration Status:  Euvolemic   PONV Controlled:  Controlled   Airway Patency:  Patent      Post Op Vitals Reviewed: Yes      Staff: Anesthesiologist, CRNA         No complications documented      BP   145/88   Temp  97 5   Pulse  84   Resp   16   SpO2   100

## 2022-04-29 NOTE — OP NOTE
OPERATIVE REPORT  PATIENT NAME: Nanci Harrell    :  1970  MRN: [id number]  Pt Location: [facility] OR ROOM 02    SURGERY DATE: 2022    Surgeon(s) and Role:     * Brittanie Cai - Primary     * Lulú Gil MD - Assisting     * Annia Frey PA-C    Preop Diagnosis:  Olecranon bursitis of left elbow [M70 22]  Pre-op testing [Z01 818]    Post-Op Diagnosis Codes:     * Olecranon bursitis of left elbow [M70 22]     * Pre-op testing [Z01 818]    Procedure(s) (LRB):  EXCISION BURSA OLECRANON with excision of osteophyte (Left)    Specimen(s):  * No specimens in log *    Estimated Blood Loss:   Minimal    Drains:  * No LDAs found *    Anesthesia Type:   Choice    Operative Indications:  Olecranon bursitis of left elbow [M70 22]  Pre-op testing [Z01 818]      Operative Findings:  Effusion of the olecranon bursa with scarring and inflammation of the bursal sac  Small enthesophyte of the olecranon process  Complications:   None    Procedure and Technique:  The patient was seen in the preoperative holding area where his operative extremity was marked  He was taken to the operating room and placed in the lateral decubitus position  His left upper extremity was prepped and draped in the usual sterile fashion  A time-out was called and the patient was restored Ancef 2 g IV prior to incision  Using a 15 blade knife a curvilinear incision was made over the tip of the olecranon  Subcutaneous dissection was taken down to the olecranon bursa  This was excised using Metzenbaum scissors  Once the bursa was completely excised attention was brought to the olecranon process or a small enthesophyte was found  A 15 blade knife was used to incise the triceps tendon at its insertion onto the enthesophyte  The enthesophyte was skeletonized using a 15 blade knife  Using an osteotome the enthesophyte was excised  Rongeur was used to further the bride any rough edges on the olecranon process    A rasp was also used to further smooth the olecranon process  Bone wax was placed at the site of the excision  The small triceps incision was closed with 2-0 Vicryl  Copious irrigation was performed in the wound  The subcutaneous skin was closed with 2-0 Monocryl and 3-0 Monocryl was used to run the epidermis for closure of the wound  Exofin was placed  Mepilex dressing was placed  A Indonesian was placed  The patient was placed in a regular sling  There is no complications throughout the case  The patient tolerated the procedure well       I was present for the entire procedure and A physician assistant was required during the procedure for retraction tissue handling,dissection and suturing    Patient Disposition:  PACU       SIGNATURE: Brittanie Trell  DATE: April 29, 2022  TIME: 9:26 AM

## 2022-04-29 NOTE — DISCHARGE INSTRUCTIONS
Brittanie Cai DO    Orthopedic Surgery, Shoulder/Elbow and Sports Medicine  [facility]   [address] [facility], [address] [place] [address] [place]  Phone: [phone]    General Post-op Surgical Instructions:    Date of Procedure - 04/29/2022    Procedure - excision of left elbow olecranon bursa and enthesophyte    Weight Bearing Status - nonweightbearing left upper extremity    DVT Prophylaxis and Duration - not applicable    PT/OT Instructions - to be discussed at your 1st postoperative visit    Stitches/Staples removed at 7-10 days postop; place steristrips on incision site    Wound Care - keep dressings clean and dry at all times  Do not remove  Xray follow up - to be done at or prior to office visit follow-up if indicated    Additional Info - take medications as prescribed    Any questions or concerns please call [phone] please!

## 2022-04-29 NOTE — H&P (VIEW-ONLY)
[address] [address] [place] [address]  Galo Hassan  [id number]  1970    ORTHOPAEDIC SURGERY OUTPATIENT NOTE  4/13/2022      HISTORY:  46 y o  male presents today for initial evaluation for his left olecranon bursitis  Patient was seen in the ED on 03/23/2022, due to increased pain and swelling after fall on 03/22/2022  He was then seen by TANIA Guevara on 03/25/2022 who recommended to use a compression wrap on the elbow, heating several times daily, use of naproxen as needed for pain/discomfort/swelling      Past Medical History:   Diagnosis Date    Bipolar disorder ([facility] [address] )     Chronic back pain     Chronic back pain     History of herniated intervertebral disc     Inguinal hernia     Paranoia (HCC)     Schizophrenia ([facility] )     Seasonal allergies     Umbilical hernia        Past Surgical History:   Procedure Laterality Date    NO PAST SURGERIES      MN REPAIR ING HERNIA,5+Y/O,REDUCIBL Right 2/15/2022    Procedure: REPAIR HERNIA INGUINAL;  Surgeon: Estiven Ghotra MD;  Location:  MAIN OR;  Service: General    MN REPAIR UMBILICAL JTZZ,7+W/J,DYKOI N/A 2/15/2022    Procedure: REPAIR HERNIA UMBILICAL;  Surgeon: Estiven Ghotra MD;  Location:  MAIN OR;  Service: General       Social History     Socioeconomic History    Marital status: Single     Spouse name: Not on file    Number of children: Not on file    Years of education: Not on file    Highest education level: Not on file   Occupational History    Not on file   Tobacco Use    Smoking status: Current Some Day Smoker     Types: Cigarettes    Smokeless tobacco: Never Used    Tobacco comment: 1 cigarette per day and vapes 1 x per day   Vaping Use    Vaping Use: Former   Substance and Sexual Activity    Alcohol use: Not Currently    Drug use: Yes     Frequency: 7 0 times per week     Types: Marijuana     Comment: daily    Sexual activity: Not on file   Other Topics Concern    Not on file   Social History Narrative    Not on file     Social Determinants of Health     Financial Resource Strain: Not on file   Food Insecurity: Not on file   Transportation Needs: Not on file   Physical Activity: Not on file   Stress: Not on file   Social Connections: Not on file   Intimate Partner Violence: Not on file   Housing Stability: Not on file       Family History   Adopted: Yes        Patient's Medications   New Prescriptions    No medications on file   Previous Medications    NAPROXEN (NAPROSYN) 500 MG TABLET    Take 1 tablet (500 mg total) by mouth 2 (two) times a day with meals for 10 days    OXYCODONE-ACETAMINOPHEN (PERCOCET) 5-325 MG PER TABLET    Take 1 tablet by mouth every 4 (four) hours as needed for moderate pain for up to 12 doses Max Daily Amount: 6 tablets   Modified Medications    No medications on file   Discontinued Medications    No medications on file       No Known Allergies      5' 9" (1 753 m)   BMI 22 30 kg/m²      REVIEW OF SYSTEMS:  Constitutional: Negative  HEENT: Negative  Respiratory: Negative  Skin: Negative  Neurological: Negative  Psychiatric/Behavioral: Negative  Musculoskeletal: Negative except for that mentioned in the HPI      PHYSICAL EXAM:      R elbow:  Flexion: 140 degrees  Extension: 0 degrees  Pronation: 80 degrees  Supination: 80 degrees    TTP Lateral Epicondyle: negative  TTP Medial Epicondyle: negative  TTP Olecranon: negative  TTP Radial Head: negative  TTP Biceps Tendon: negative    Strength:  Flexion: 5/5  Extension: 5/5  Pronation: 5/5  Supination: 5/5    Pain with resisted wrist extension: negative  Pain with resisted 3rd finger extension: negative  Pain with resisted wrist flexion: negative    Varus laxity: negative  Valgus laxity: negative  Milking maneuver: negative  Moving valgus stress test: negative    Cubital tunnel Tinel's: negative    Radial/median/ulnar nerve intact    <2 sec cap refill      L elbow:  Flexion: 140 degrees  Extension: 0 degrees  Pronation: 80 degrees  Supination: 80 degrees    TTP Lateral Epicondyle: negative  TTP Medial Epicondyle: negative  TTP Olecranon: Positive at the triceps attachment  TTP Radial Head: negative  TTP Biceps Tendon: negative    Strength:  Flexion: 5/5  Extension: 5/5  Pronation: 5/5  Supination: 5/5    Pain with resisted wrist extension: negative  Pain with resisted 3rd finger extension: negative  Pain with resisted wrist flexion: negative    Varus laxity: negative  Valgus laxity: negative  Milking maneuver: negative  Moving valgus stress test: negative    Cubital tunnel Tinel's: negative    Radial/median/ulnar nerve intact    <2 sec cap refill    Neck:   Spurling's Maneuver: negative  FROM flexion, extension, rotation, sidebending    Reflexes:   Triceps: symmetric bilaterally  Biceps: symmetric bilaterally  Brachioradialis: symmetric bilaterally      Integumentary: - intact  Bruising: - No  Abrasion: - No   Rash - No   Laceration: - No       IMAGING:  Xrays of left elbow reviewed form 3/23/22:   Enthesophyte noted with olecranon bursal swelling    ASSESSMENT AND PLAN:  46 y o  male  Recurrent Olecranon bursitis of left elbow  Non operative treatments were discussed with the patient that includes an aspiration, did explain that there is a reoccurrence rate due to there being an Enthesophyte that continues to irritate the bursa sac  Surgical intervention would be an excision of olecranon bursa and osteophyte of the left elbow  The risks, benefits and recovery was discussed with the patient that includes continued soreness at the surgical site for up to 6-8 weeks post operatively  Patient shows understanding and agrees with this plan of care   The patient understands the risks and benefits of the procedure with risks including pain, stiffness, infection, neurovascular injury, recurrence of symptoms, failure of surgical procedure, inadvertent intraoperative complications, blood loss, blood clots, allergic reaction to anesthesia, stroke, heart attack, all up to and including to death  The patient understood and did consent for surgery today       Scribe Attestation    I,:  Renato Khan am acting as a scribe while in the presence of the attending physician :       I,:  Geno Vásquez personally performed the services described in this documentation    as scribed in my presence :

## 2022-04-29 NOTE — H&P
[address] [facility] [address]  Galo Hassan  [id number]  1970    ORTHOPAEDIC SURGERY OUTPATIENT NOTE  4/13/2022      HISTORY:  46 y o  male presents today for initial evaluation for his left olecranon bursitis  Patient was seen in the ED on 03/23/2022, due to increased pain and swelling after fall on 03/22/2022  He was then seen by TANIA Geiger on 03/25/2022 who recommended to use a compression wrap on the elbow, heating several times daily, use of naproxen as needed for pain/discomfort/swelling      Past Medical History:   Diagnosis Date    [facility] disorder ([facility] [address] [facility] )     Chronic back pain     Chronic back pain     History of herniated intervertebral disc     Inguinal hernia     Paranoia (HCC)     Schizophrenia ([facility] )     Seasonal allergies     Umbilical hernia        Past Surgical History:   Procedure Laterality Date    NO PAST SURGERIES      MO REPAIR ING HERNIA,5+Y/O,REDUCIBL Right 2/15/2022    Procedure: REPAIR HERNIA INGUINAL;  Surgeon: Brissa Bansal MD;  Location: [facility] MAIN OR;  Service: General    MO REPAIR UMBILICAL HWUP,9+P/B,GOFEP N/A 2/15/2022    Procedure: REPAIR HERNIA UMBILICAL;  Surgeon: Brissa Bansal MD;  Location: [facility] MAIN OR;  Service: General       Social History     Socioeconomic History    Marital status: Single     Spouse name: Not on file    Number of children: Not on file    Years of education: Not on file    Highest education level: Not on file   Occupational History    Not on file   Tobacco Use    Smoking status: Current Some Day Smoker     Types: Cigarettes    Smokeless tobacco: Never Used    Tobacco comment: 1 cigarette per day and vapes 1 x per day   Vaping Use    Vaping Use: Former   Substance and Sexual Activity    Alcohol use: Not Currently    Drug use: Yes     Frequency: 7 0 times per week     Types: Marijuana     Comment: daily    Sexual activity: Not on file   Other Topics Concern    Not on file   Social History Narrative    Not on file     Social Determinants of Health     Financial Resource Strain: Not on file   Food Insecurity: Not on file   Transportation Needs: Not on file   Physical Activity: Not on file   Stress: Not on file   Social Connections: Not on file   Intimate Partner Violence: Not on file   Housing Stability: Not on file       Family History   Adopted: Yes        Patient's Medications   New Prescriptions    No medications on file   Previous Medications    NAPROXEN (NAPROSYN) 500 MG TABLET    Take 1 tablet (500 mg total) by mouth 2 (two) times a day with meals for 10 days    OXYCODONE-ACETAMINOPHEN (PERCOCET) 5-325 MG PER TABLET    Take 1 tablet by mouth every 4 (four) hours as needed for moderate pain for up to 12 doses Max Daily Amount: 6 tablets   Modified Medications    No medications on file   Discontinued Medications    No medications on file       No Known Allergies      5' 9" (1 753 m)   BMI 22 30 kg/m²      REVIEW OF SYSTEMS:  Constitutional: Negative  HEENT: Negative  Respiratory: Negative  Skin: Negative  Neurological: Negative  Psychiatric/Behavioral: Negative  Musculoskeletal: Negative except for that mentioned in the HPI      PHYSICAL EXAM:      R elbow:  Flexion: 140 degrees  Extension: 0 degrees  Pronation: 80 degrees  Supination: 80 degrees    TTP Lateral Epicondyle: negative  TTP Medial Epicondyle: negative  TTP Olecranon: negative  TTP Radial Head: negative  TTP Biceps Tendon: negative    Strength:  Flexion: 5/5  Extension: 5/5  Pronation: 5/5  Supination: 5/5    Pain with resisted wrist extension: negative  Pain with resisted 3rd finger extension: negative  Pain with resisted wrist flexion: negative    Varus laxity: negative  Valgus laxity: negative  Milking maneuver: negative  Moving valgus stress test: negative    Cubital tunnel Tinel's: negative    Radial/median/ulnar nerve intact    <2 sec cap refill      L elbow:  Flexion: 140 degrees  Extension: 0 degrees  Pronation: 80 degrees  Supination: 80 degrees    TTP Lateral Epicondyle: negative  TTP Medial Epicondyle: negative  TTP Olecranon: Positive at the triceps attachment  TTP Radial Head: negative  TTP Biceps Tendon: negative    Strength:  Flexion: 5/5  Extension: 5/5  Pronation: 5/5  Supination: 5/5    Pain with resisted wrist extension: negative  Pain with resisted 3rd finger extension: negative  Pain with resisted wrist flexion: negative    Varus laxity: negative  Valgus laxity: negative  Milking maneuver: negative  Moving valgus stress test: negative    Cubital tunnel Tinel's: negative    Radial/median/ulnar nerve intact    <2 sec cap refill    Neck:   Spurling's Maneuver: negative  FROM flexion, extension, rotation, sidebending    Reflexes:   Triceps: symmetric bilaterally  Biceps: symmetric bilaterally  Brachioradialis: symmetric bilaterally      Integumentary: - intact  Bruising: - No  Abrasion: - No   Rash - No   Laceration: - No       IMAGING:  Xrays of left elbow reviewed form 3/23/22:   Enthesophyte noted with olecranon bursal swelling    ASSESSMENT AND PLAN:  46 y o  male  Recurrent Olecranon bursitis of left elbow  Non operative treatments were discussed with the patient that includes an aspiration, did explain that there is a reoccurrence rate due to there being an Enthesophyte that continues to irritate the bursa sac  Surgical intervention would be an excision of olecranon bursa and osteophyte of the left elbow  The risks, benefits and recovery was discussed with the patient that includes continued soreness at the surgical site for up to 6-8 weeks post operatively  Patient shows understanding and agrees with this plan of care   The patient understands the risks and benefits of the procedure with risks including pain, stiffness, infection, neurovascular injury, recurrence of symptoms, failure of surgical procedure, inadvertent intraoperative complications, blood loss, blood clots, allergic reaction to anesthesia, stroke, heart attack, all up to and including to death  The patient understood and did consent for surgery today       Scribe Attestation    I,:  Harry Maloney am acting as a scribe while in the presence of the attending physician :       I,:  Joan Veronica personally performed the services described in this documentation    as scribed in my presence :

## 2022-05-02 ENCOUNTER — TELEPHONE (OUTPATIENT)
Dept: OBGYN CLINIC | Facility: CLINIC | Age: 52
End: 2022-05-02

## 2022-05-02 ENCOUNTER — HOSPITAL ENCOUNTER (EMERGENCY)
Facility: HOSPITAL | Age: 52
Discharge: HOME | End: 2022-05-02
Attending: EMERGENCY MEDICINE | Admitting: EMERGENCY MEDICINE
Payer: COMMERCIAL

## 2022-05-02 VITALS
OXYGEN SATURATION: 97 % | RESPIRATION RATE: 16 BRPM | HEART RATE: 69 BPM | TEMPERATURE: 98 F | DIASTOLIC BLOOD PRESSURE: 80 MMHG | SYSTOLIC BLOOD PRESSURE: 137 MMHG

## 2022-05-02 DIAGNOSIS — G89.18 POST-OPERATIVE PAIN: Primary | ICD-10-CM

## 2022-05-02 PROCEDURE — 99282 EMERGENCY DEPT VISIT SF MDM: CPT | Performed by: EMERGENCY MEDICINE

## 2022-05-02 PROCEDURE — 99281 EMR DPT VST MAYX REQ PHY/QHP: CPT

## 2022-05-02 RX ORDER — OXYCODONE HYDROCHLORIDE 5 MG/1
5 CAPSULE ORAL EVERY 6 HOURS PRN
Qty: 10 CAPSULE | Refills: 0 | Status: SHIPPED | OUTPATIENT
Start: 2022-05-02 | End: 2022-05-11 | Stop reason: SDUPTHER

## 2022-05-02 NOTE — DISCHARGE INSTRUCTIONS
DIAGNOSIS; POST OPERATIVE PAIN FROM LEFT ELBOW SURGERY       - ONLY AS NEEDED OXYCODONE OVER NEXT SEVERAL 1-2 DAYS- SHOULD START TO DECREASE OXYCODONE USAGE AND SWITCH TO OVER THE COUNTER TYLENOL 500 MG EVERY 4 HRS     - PLEASE RETURN TO  THE ER FOR ANY FEVERS- TEMP > 100 4/ OR ANY NEW/ WORSENING/CONCERNING SYMPTOMS TO YOU

## 2022-05-02 NOTE — TELEPHONE ENCOUNTER
Patient called into the office today , he had surgery on 4/29/22 with Berline Olszewski , patient states he was given 2 days worth of pain medication and antibiotics  He states he ran out of both and went to the ED this am due to pain and medication   At the ED he states they gave him pain medication but no antibiotic and he is wanting to know if he still needs to continue the antibiotic         Please call patient back to discuss    Pharmacy: [id number], [address] [facility]  [phone]      #: [phone]

## 2022-05-04 NOTE — ED PROVIDER NOTES
History  Chief Complaint   Patient presents with    Medication Refill     pt c/o L elbow pain after procedure on 4/29  states he got 8 oxycodone and is unable to see dr until 5/5      46 yr male had 4/29 left elbow ortho surg- states was give  Perc # 8 -- as is askign for several days more of pain meds-- no re injury -- no fever/chills/ systemic comps -- pt has dressing on  And arm in sling       History provided by:  Patient   used: No        Prior to Admission Medications   Prescriptions Last Dose Informant Patient Reported? Taking?    cephalexin (KEFLEX) 500 mg capsule   No No   Sig: Take 1 capsule (500 mg total) by mouth every 6 (six) hours for 2 days   naproxen (NAPROSYN) 250 mg tablet   Yes No   Sig: Take 500 mg by mouth 2 (two) times a day with meals   oxyCODONE (Roxicodone) 5 immediate release tablet   No No   Sig: Take 1 tablet (5 mg total) by mouth every 6 (six) hours as needed for severe pain for up to 2 days Max Daily Amount: 20 mg      Facility-Administered Medications: None       Past Medical History:   Diagnosis Date    Bipolar disorder ([facility] [address] )     Chronic back pain     Chronic back pain     History of herniated intervertebral disc     Inguinal hernia     Paranoia ([facility] [address] )     Schizophrenia ([facility] )     Seasonal allergies     Umbilical hernia        Past Surgical History:   Procedure Laterality Date    HERNIA REPAIR      NH REMOVAL OF ELBOW BURSA Left 4/29/2022    Procedure: EXCISION BURSA OLECRANON with excision of osteophyte;  Surgeon: Juan Hopkins;  Location:  MAIN OR;  Service: Orthopedics    NH REPAIR [address] HERNIA,5+Y/O,REDUCIBL Right 2/15/2022    Procedure: [address] [facility];  Surgeon: Stone Ramos MD;  Location: [facility] MAIN OR;  Service: General    NH REPAIR UMBILICAL UVKF,0+C/Z,STRII N/A 2/15/2022    Procedure: REPAIR HERNIA UMBILICAL;  Surgeon: Stone Ramos MD;  Location:  MAIN OR;  Service: General       Family History   Adopted: Yes     I have reviewed and agree with the history as documented  E-Cigarette/Vaping    E-Cigarette Use Former User      E-Cigarette/Vaping Substances    Nicotine No     THC No     CBD No     Flavoring No     Other No     Unknown No      Social History     Tobacco Use    Smoking status: Current Some Day Smoker     Types: Cigarettes    Smokeless tobacco: Never Used    Tobacco comment: 1 cigarette per day and vapes 1 x per day   Vaping Use    Vaping Use: Former   Substance Use Topics    Alcohol use: Not Currently    Drug use: Yes     Frequency: 7 0 times per week     Types: Marijuana     Comment: daily       Review of Systems   Constitutional: Negative  HENT: Negative  Eyes: Negative  Respiratory: Negative  Cardiovascular: Negative  Gastrointestinal: Negative  Endocrine: Negative  Genitourinary: Negative  Musculoskeletal: Negative  Left elbow pain    Skin: Negative  Allergic/Immunologic: Negative  Neurological: Negative  Hematological: Negative  Psychiatric/Behavioral: Negative  Physical Exam  Physical Exam  Vitals and nursing note reviewed  Constitutional:       General: He is not in acute distress  Appearance: Normal appearance  He is not ill-appearing, toxic-appearing or diaphoretic  Comments: avss-- pulse ox 97 % on ra- interpretation is normal- no intervention    Musculoskeletal:         General: Tenderness present  No swelling, deformity or signs of injury  Comments: Left elbow- in sling - dressing over olecranon area-- not removed- no surrounding erythema/ edema/ -- normal lue distal pulse/sensation/strength/rom / cap refill   Neurological:      Mental Status: He is alert           Vital Signs  ED Triage Vitals [05/02/22 0939]   Temperature Pulse Respirations Blood Pressure SpO2   98 °F (36 7 °C) 69 16 137/80 97 %      Temp Source Heart Rate Source Patient Position - Orthostatic VS BP Location FiO2 (%)   Oral Monitor Sitting Right arm --      Pain Score --           Vitals:    05/02/22 0939   BP: 137/80   Pulse: 69   Patient Position - Orthostatic VS: Sitting         Visual Acuity      ED Medications  Medications - No data to display    Diagnostic Studies  Results Reviewed     None                 No orders to display              Procedures  Procedures         ED Course  ED Course as of 05/04/22 1508   Mon May 02, 2022   1025 - ER MD NOTE- PDMP REVIEWED BY ER MD--    1029 Er md note- 4/29/22- op note reviewed by er md                                             MDM    Disposition  Final diagnoses:   Post-operative pain     Time reflects when diagnosis was documented in both MDM as applicable and the Disposition within this note     Time User Action Codes Description Comment    5/2/2022 10:26 AM Angela Theodore Add [G89 18] Post-operative pain       ED Disposition     ED Disposition Condition Date/Time Comment    Discharge Stable Mon May 2, 2022 10:25 AM Traci Albert discharge to home/self care  Follow-up Information    None         Discharge Medication List as of 5/2/2022 10:29 AM      START taking these medications    Details   oxyCODONE (OXY-IR) 5 MG capsule Take 1 capsule (5 mg total) by mouth every 6 (six) hours as needed for severe pain for up to 10 doses CAN SUBSTITUTE OXYCODONE TABLETS FOR CAPSULES Max Daily Amount: 20 mg, Starting Mon 5/2/2022, Normal         CONTINUE these medications which have NOT CHANGED    Details   naproxen (NAPROSYN) 250 mg tablet Take 500 mg by mouth 2 (two) times a day with meals, Historical Med         STOP taking these medications       cephalexin (KEFLEX) 500 mg capsule Comments:   Reason for Stopping:         oxyCODONE (Roxicodone) 5 immediate release tablet Comments:   Reason for Stopping:               No discharge procedures on file      PDMP Review       Value Time User    PDMP Reviewed  Yes 3/23/2022  6:20 AM Martinez Thomas MD          ED Provider  Electronically Signed by           Mateo Moran MD  05/04/22 1511

## 2022-05-06 ENCOUNTER — TELEPHONE (OUTPATIENT)
Dept: OBGYN CLINIC | Facility: CLINIC | Age: 52
End: 2022-05-06

## 2022-05-11 ENCOUNTER — TELEPHONE (OUTPATIENT)
Dept: OBGYN CLINIC | Facility: HOSPITAL | Age: 52
End: 2022-05-11

## 2022-05-11 VITALS
BODY MASS INDEX: 22.13 KG/M2 | OXYGEN SATURATION: 97 % | HEART RATE: 63 BPM | HEIGHT: 68 IN | DIASTOLIC BLOOD PRESSURE: 72 MMHG | SYSTOLIC BLOOD PRESSURE: 130 MMHG | WEIGHT: 146 LBS

## 2022-05-11 DIAGNOSIS — G89.18 POST-OPERATIVE PAIN: ICD-10-CM

## 2022-05-11 PROCEDURE — 99024 POSTOP FOLLOW-UP VISIT: CPT | Performed by: ORTHOPAEDIC SURGERY

## 2022-05-11 RX ORDER — OXYCODONE HYDROCHLORIDE 5 MG/1
5 CAPSULE ORAL EVERY 6 HOURS PRN
Qty: 8 CAPSULE | Refills: 0 | Status: SHIPPED | OUTPATIENT
Start: 2022-05-11 | End: 2022-05-13

## 2022-05-11 NOTE — PROGRESS NOTES
Tulio Araiza is 1 week post-op:  Excision of left elbow enthesophyte and bursa  Presenting for routine follow-up  S:  Doing well  Patient has noted no excessive redness, swelling and pain  O:  Wound healing well  No signs of infection  A:  Satisfactory course  P: Sutures removed  Patient to return in 4 weeks  Patient is to return as needed for redness, swelling, discomfort, or any concern about his surgery

## 2022-05-11 NOTE — TELEPHONE ENCOUNTER
Patient calling in stating the pharmacy does not have his Oxycodone script  Confirmed with patient that the script was sent  Patient spoke with pharmacist and I heard them say it can take an hour to fill  Patient's car was ringing in the background and it was hard to hear patient's at times

## 2022-05-13 ENCOUNTER — TELEPHONE (OUTPATIENT)
Dept: OBGYN CLINIC | Facility: HOSPITAL | Age: 52
End: 2022-05-13

## 2022-05-13 NOTE — TELEPHONE ENCOUNTER
As per Dr Nesha Menchaca: No more refills, he is almost 2 weeks out from minor surgery, at this time he is to switch to Tylenol and/or OTC anti inflammatories,    I called patient and advised

## 2023-11-30 ENCOUNTER — HOSPITAL ENCOUNTER (EMERGENCY)
Facility: HOSPITAL | Age: 53
Discharge: HOME | End: 2023-11-30
Attending: EMERGENCY MEDICINE
Payer: COMMERCIAL

## 2023-11-30 ENCOUNTER — APPOINTMENT (EMERGENCY)
Dept: RADIOLOGY | Facility: HOSPITAL | Age: 53
End: 2023-11-30
Payer: COMMERCIAL

## 2023-11-30 VITALS
TEMPERATURE: 97.6 F | SYSTOLIC BLOOD PRESSURE: 116 MMHG | HEART RATE: 82 BPM | DIASTOLIC BLOOD PRESSURE: 76 MMHG | RESPIRATION RATE: 18 BRPM | OXYGEN SATURATION: 98 %

## 2023-11-30 DIAGNOSIS — R51.9 HEADACHE: ICD-10-CM

## 2023-11-30 DIAGNOSIS — Z77.120 SUSPECTED EXPOSURE TO MOLD: ICD-10-CM

## 2023-11-30 DIAGNOSIS — R05.9 COUGH: Primary | ICD-10-CM

## 2023-11-30 LAB
FLUAV RNA RESP QL NAA+PROBE: NEGATIVE
FLUBV RNA RESP QL NAA+PROBE: NEGATIVE
RSV RNA RESP QL NAA+PROBE: NEGATIVE
SARS-COV-2 RNA RESP QL NAA+PROBE: NEGATIVE

## 2023-11-30 PROCEDURE — 99284 EMERGENCY DEPT VISIT MOD MDM: CPT

## 2023-11-30 PROCEDURE — 0241U HB NFCT DS VIR RESP RNA 4 TRGT: CPT

## 2023-11-30 PROCEDURE — 71046 X-RAY EXAM CHEST 2 VIEWS: CPT

## 2023-11-30 RX ORDER — IBUPROFEN 600 MG/1
600 TABLET ORAL EVERY 6 HOURS PRN
Qty: 30 TABLET | Refills: 0 | Status: SHIPPED | OUTPATIENT
Start: 2023-11-30

## 2023-11-30 RX ORDER — IBUPROFEN 600 MG/1
600 TABLET ORAL ONCE
Status: COMPLETED | OUTPATIENT
Start: 2023-11-30 | End: 2023-11-30

## 2023-11-30 RX ORDER — IBUPROFEN 600 MG/1
600 TABLET ORAL EVERY 6 HOURS PRN
Qty: 30 TABLET | Refills: 0 | Status: SHIPPED | OUTPATIENT
Start: 2023-11-30 | End: 2023-11-30 | Stop reason: SDUPTHER

## 2023-11-30 RX ADMIN — IBUPROFEN 600 MG: 600 TABLET, FILM COATED ORAL at 13:52

## 2023-11-30 NOTE — ED NOTES
Pt given 2 bus passes and given sandwich.  Walked to [place] to get daughter and will then be back to get bus with wife in waiting room     Azar Diggs RN  11/30/23 [phone]

## 2023-11-30 NOTE — ED NOTES
Patient reports no means of transportation to get home, ED Charge RN and Hospital supervisor aware, plan for patients to speak with case management. Patient moved to family waiting room for case management consult.       Skye Davalos RN  11/30/23 [id number]

## 2023-12-02 NOTE — ED PROVIDER NOTES
History  Chief Complaint   Patient presents with    Nasal Congestion     Pt came in via EMS from home. Pt reports nasal congestion, cough, and headache for past couple of months. Pt thinks there is mold in his apartment. Patient is a 70-year-old male presenting for evaluation of nasal congestion, cough, headache that has been ongoing for the past few months. He and his girlfriend have been living in his apartment and both reports significant mold exposure. Have contacted landlord who provided them with a humidifier however has not taken any further action. He reports that in his sputum that he is coughing up has turned black. No fevers, chest pain, abdominal pain, nausea, vomiting, diarrhea. No medications prior to arrival.          Prior to Admission Medications   Prescriptions Last Dose Informant Patient Reported?  Taking?   naproxen (NAPROSYN) 250 mg tablet   Yes No   Sig: Take 500 mg by mouth 2 (two) times a day with meals      Facility-Administered Medications: None       Past Medical History:   Diagnosis Date    Bipolar disorder ([address])     Chronic back pain     Chronic back pain     History of herniated intervertebral disc     Inguinal hernia     Paranoia ([address])     Schizophrenia ([address])     Seasonal allergies     Umbilical hernia        Past Surgical History:   Procedure Laterality Date    HERNIA REPAIR      CO EXCISION OLECRANON BURSA Left 4/29/2022    Procedure: EXCISION BURSA OLECRANON with excision of osteophyte;  Surgeon: Leigh Cameron;  Location: [facility];  Service: Orthopedics    CO RPR 1ST Texie Blank AGE 5 YRS/> REDUCIBLE Right 2/15/2022    Procedure: REPAIR HERNIA INGUINAL;  Surgeon: Pepe Zamora MD;  Location: [facility];  Service: General    CO RPR UMBILICAL HRNA 5 YRS/> REDUCIBLE N/A 2/15/2022    Procedure: REPAIR HERNIA UMBILICAL;  Surgeon: Pepe Zamora MD;  Location: [facility];  Service: General       Family History   Adopted: Yes     I have reviewed and agree with the history as documented. E-Cigarette/Vaping    E-Cigarette Use Former User      E-Cigarette/Vaping Substances    Nicotine No     THC No     CBD No     Flavoring No     Other No     Unknown No      Social History     Tobacco Use    Smoking status: Some Days     Types: Cigarettes    Smokeless tobacco: Never    Tobacco comments:     1 cigarette per day and vapes 1 x per day   Vaping Use    Vaping Use: Former   Substance Use Topics    Alcohol use: Not Currently    Drug use: Yes     Frequency: 7.0 times per week     Types: Marijuana     Comment: daily       Review of Systems   Constitutional:  Negative for chills and fever. HENT:  Positive for congestion. Negative for ear pain and sore throat. Eyes:  Negative for pain and visual disturbance. Respiratory:  Positive for cough. Negative for shortness of breath. Cardiovascular:  Negative for chest pain and leg swelling. Gastrointestinal:  Negative for abdominal pain, diarrhea, nausea and vomiting. Genitourinary:  Negative for dysuria and flank pain. Musculoskeletal:  Negative for back pain and neck pain. Skin:  Negative for rash. Neurological:  Negative for dizziness and headaches. Psychiatric/Behavioral:  Negative for confusion. Physical Exam  Physical Exam  Vitals and nursing note reviewed. Constitutional:       General: He is not in acute distress. Appearance: Normal appearance. He is not ill-appearing or toxic-appearing. HENT:      Head: Normocephalic and atraumatic. Right Ear: External ear normal.      Left Ear: External ear normal.      Nose: Nose normal.      Mouth/Throat:      Mouth: Mucous membranes are moist.      Pharynx: No posterior oropharyngeal erythema. Eyes:      General: No scleral icterus. Right eye: No discharge. Left eye: No discharge. Extraocular Movements: Extraocular movements intact.       Conjunctiva/sclera: Conjunctivae normal.   Cardiovascular:      Rate and Rhythm: Normal rate and regular rhythm. Pulses: Normal pulses. Heart sounds: Normal heart sounds. Pulmonary:      Effort: Pulmonary effort is normal. No respiratory distress. Breath sounds: Normal breath sounds. No wheezing, rhonchi or rales. Abdominal:      Palpations: Abdomen is soft. Tenderness: There is no abdominal tenderness. Musculoskeletal:         General: No tenderness, deformity or signs of injury. Cervical back: Normal range of motion and neck supple. Skin:     General: Skin is dry. Coloration: Skin is not jaundiced. Findings: No erythema or rash. Neurological:      General: No focal deficit present. Mental Status: He is alert and oriented to person, place, and time. Mental status is at baseline. Motor: No weakness. Gait: Gait normal.   Psychiatric:         Mood and Affect: Mood normal.         Behavior: Behavior normal.         Thought Content:  Thought content normal.         Vital Signs  ED Triage Vitals   Temperature Pulse Respirations Blood Pressure SpO2   11/30/23 1243 11/30/23 1243 11/30/23 1243 11/30/23 1243 11/30/23 1243   97.6 °F (36.4 °C) 82 18 116/76 98 %      Temp Source Heart Rate Source Patient Position - Orthostatic VS BP Location FiO2 (%)   11/30/23 1243 11/30/23 1243 11/30/23 1243 11/30/23 1243 --   Oral Monitor Sitting Right arm       Pain Score       11/30/23 1352       10 - Worst Possible Pain           Vitals:    11/30/23 1243   BP: 116/76   Pulse: 82   Patient Position - Orthostatic VS: Sitting         Visual Acuity      ED Medications  Medications   ibuprofen (MOTRIN) tablet 600 mg (600 mg Oral Given 11/30/23 1352)       Diagnostic Studies  Results Reviewed       Procedure Component Value Units Date/Time    FLU/RSV/COVID - if FLU/RSV clinically relevant [[id number]]  (Normal) Collected: 11/30/23 1351    Lab Status: Final result Specimen: Nares from Nose Updated: 11/30/23 1722     SARS-CoV-2 Negative     INFLUENZA A PCR Negative     INFLUENZA B PCR Negative     RSV PCR Negative    Narrative:      FOR PEDIATRIC PATIENTS - copy/paste COVID Guidelines URL to browser: [email or web address]    SARS-CoV-2 assay is a Nucleic Acid Amplification assay intended for the  qualitative detection of nucleic acid from SARS-CoV-2 in nasopharyngeal  swabs. Results are for the presumptive identification of SARS-CoV-2 RNA. Positive results are indicative of infection with SARS-CoV-2, the virus  causing COVID-19, but do not rule out bacterial infection or co-infection  with other viruses. Laboratories within the [facility] and its  territories are required to report all positive results to the appropriate  public health authorities. Negative results do not preclude SARS-CoV-2  infection and should not be used as the sole basis for treatment or other  patient management decisions. Negative results must be combined with  clinical observations, patient history, and epidemiological information. This test has not been FDA cleared or approved. This test has been authorized by FDA under an Emergency Use Authorization  (EUA). This test is only authorized for the duration of time the  declaration that circumstances exist justifying the authorization of the  emergency use of an in vitro diagnostic tests for detection of SARS-CoV-2  virus and/or diagnosis of COVID-19 infection under section 564(b)(1) of  the Act, 21 U. S.C. 055HLT-4(X)(1), unless the authorization is terminated  or revoked sooner. The test has been validated but independent review by FDA  and CLIA is pending. Test performed using [employer] GeneXpert: This RT-PCR assay targets N2,  a region unique to SARS-CoV-2. A conserved region in the E-gene was chosen  for pan-Sarbecovirus detection which includes SARS-CoV-2. According to CMS-2020-01-R, this platform meets the definition of high-throughput technology.                    XR chest 2 views   Final Result by Marsha Hernandez MD (12/01 1443)      No acute cardiopulmonary disease. Workstation performed: [id number]                    Procedures  Procedures         ED Course  ED Course as of 12/01/23 2012   Thu Nov 30, 2023   1430 TT sent to case management. SBIRT 20yo+      Flowsheet Row Most Recent Value   Initial Alcohol Screen: US AUDIT-C     1. How often do you have a drink containing alcohol? 1 Filed at: 11/30/2023 1245   2. How many drinks containing alcohol do you have on a typical day you are drinking? 0 Filed at: 11/30/2023 1245   3a. Male UNDER 65: How often do you have five or more drinks on one occasion? 0 Filed at: 11/30/2023 1245   Audit-C Score 1 Filed at: 11/30/2023 1245   MIRTA: How many times in the past year have you. .. Used an illegal drug or used a prescription medication for non-medical reasons? Never Filed at: 11/30/2023 1245                      Medical Decision Making  Patient is a 59-year-old male presenting for evaluation of nasal congestion and cough for the past few months with suspected mold exposure. No fevers or associated symptoms. All vital stable on arrival.  Physical exam is unremarkable. Will obtain chest x-ray, COVID/with/RSV test.    Chest x-ray negative for acute findings as interpreted by myself. Viral testing is negative. Provided consult for case management who will contact patient following discharge and provide housing resources. Patient stable at time of discharge. I have discussed findings and plan for discharge with the patient/caregiver. Follow up with the appropriate providers including primary care physician was discussed. Return precautions discussed with patient/caregiver as outlined in AVS. Patient/caregiver verbally expressed understanding. Patient stable at time of discharge and ambulated out of the emergency department.        Amount and/or Complexity of Data Reviewed  Radiology: ordered. Risk  Prescription drug management. Disposition  Final diagnoses:   Cough   Headache   Suspected exposure to mold     Time reflects when diagnosis was documented in both MDM as applicable and the Disposition within this note       Time User Action Codes Description Comment    11/30/2023  2:58 PM Sallie Cody Add [R05.9] Cough     11/30/2023  2:58 PM Lindsey Damon [R51.9] Headache     11/30/2023  2:58 PM Sallie Cody Add [T26.287] Mold exposure     11/30/2023  2:58 PM Sallie Cody Remove [P99.386] Mold exposure     11/30/2023  2:58 PM [place] Cody Add [F37.210] Suspected exposure to mold           ED Disposition       ED Disposition   Discharge    Condition   Stable    Date/Time   Thu Nov 30, 2023 [address] [place] [address] discharge to home/self care. Follow-up Information       Follow up With Specialties Details Why [address] [facility] [address] Floor Family Medicine   [address] [facility] [address] [facility] [address]  [phone]              Discharge Medication List as of 11/30/2023  3:01 PM        START taking these medications    Details   ibuprofen (MOTRIN) 600 mg tablet Take 1 tablet (600 mg total) by mouth every 6 (six) hours as needed for mild pain, Starting Thu 11/30/2023, Normal           CONTINUE these medications which have NOT CHANGED    Details   naproxen (NAPROSYN) 250 mg tablet Take 500 mg by mouth 2 (two) times a day with meals, Historical Med             No discharge procedures on file.     PDMP Review         Value Time User    PDMP Reviewed  Yes 3/23/2022  6:20 AM Ronald Murillo MD            ED Provider  Electronically Signed by             Marylin Stewart PA-C  12/01/23 2012

## (undated) DEVICE — SUT MONOCRYL 2-0 SH 27 IN Y417H

## (undated) DEVICE — DRESSING MEPILEX AG BORDER POST-OP 4 X 10 IN

## (undated) DEVICE — ANTIBACTERIAL UNDYED BRAIDED (POLYGLACTIN 910), SYNTHETIC ABSORBABLE SUTURE: Brand: COATED VICRYL

## (undated) DEVICE — GLOVE SRG BIOGEL 8

## (undated) DEVICE — STERILE BETHLEHEM PLASTIC HAND: Brand: CARDINAL HEALTH

## (undated) DEVICE — NON-STERILE REUSABLE TOURNIQUET CUFF SINGLE BLADDER, DUAL PORT AND QUICK CONNECT CONNECTOR: Brand: COLOR CUFF

## (undated) DEVICE — TOWEL SURG XR DETECT GREEN STRL RFD

## (undated) DEVICE — BETHLEHEM UNIVERSAL MINOR GEN: Brand: CARDINAL HEALTH

## (undated) DEVICE — COBAN 4 IN STERILE

## (undated) DEVICE — SUT PROLENE 2-0 CT-2 30 IN 8411H

## (undated) DEVICE — GLOVE SRG BIOGEL 6.5

## (undated) DEVICE — SUT VICRYL 3-0 SH 27 IN J416H

## (undated) DEVICE — CHLORAPREP HI-LITE 26ML ORANGE

## (undated) DEVICE — TUBING SUCTION 5MM X 12 FT

## (undated) DEVICE — SUT VICRYL 2-0 SH 27 IN UNDYED J417H

## (undated) DEVICE — VESSEL LOOPS X-RAY DETECTABLE: Brand: DEROYAL

## (undated) DEVICE — GLOVE SRG BIOGEL 7.5

## (undated) DEVICE — NEEDLE 25G X 1 1/2

## (undated) DEVICE — POOLE SUCTION HANDLE: Brand: CARDINAL HEALTH

## (undated) DEVICE — STRL PENROSE DRAIN 18" X 1/4": Brand: CARDINAL HEALTH

## (undated) DEVICE — DISPOSABLE EQUIPMENT COVER: Brand: SMALL TOWEL DRAPE

## (undated) DEVICE — CHLORAPREP HI-LITE 10.5ML ORANGE

## (undated) DEVICE — MEDI-VAC TUBING CONNECTOR 6-IN-1 STRAIGHT: Brand: CARDINAL HEALTH

## (undated) DEVICE — DRAPE SHEET THREE QUARTER

## (undated) DEVICE — ADHESIVE SKIN HIGH VISCOSITY EXOFIN 1ML

## (undated) DEVICE — SUT MONOCRYL 3-0 PS-2 27 IN Y427H

## (undated) DEVICE — PLUMEPEN PRO 10FT

## (undated) DEVICE — INTENDED FOR TISSUE SEPARATION, AND OTHER PROCEDURES THAT REQUIRE A SHARP SURGICAL BLADE TO PUNCTURE OR CUT.: Brand: BARD-PARKER ® CARBON RIB-BACK BLADES

## (undated) DEVICE — SUT MONOCRYL 4-0 PS-2 18 IN Y496G

## (undated) DEVICE — ARM SLING: Brand: DEROYAL

## (undated) DEVICE — PADS GROUND

## (undated) DEVICE — SYRINGE 10ML LL

## (undated) DEVICE — BANDAGE, ESMARK LF STR 4"X9'(20/CS): Brand: CYPRESS

## (undated) DEVICE — GLOVE PI ULTRA TOUCH SZ.7.5

## (undated) DEVICE — GLOVE INDICATOR PI UNDERGLOVE SZ 7 BLUE

## (undated) DEVICE — 3M™ STERI-STRIP™ REINFORCED ADHESIVE SKIN CLOSURES, R1547, 1/2 IN X 4 IN (12 MM X 100 MM), 6 STRIPS/ENVELOPE: Brand: 3M™ STERI-STRIP™

## (undated) DEVICE — LIGHT HANDLE COVER SLEEVE DISP BLUE STELLAR

## (undated) DEVICE — TIBURON TRANSVERSE LAPAROTOMY SHEET: Brand: CONVERTORS

## (undated) DEVICE — GLOVE INDICATOR PI UNDERGLOVE SZ 8 BLUE

## (undated) DEVICE — NEEDLE BLUNT 18 G X 1 1/2IN

## (undated) DEVICE — PADDING CAST 4 IN  COTTON STRL